# Patient Record
Sex: FEMALE | Race: BLACK OR AFRICAN AMERICAN | NOT HISPANIC OR LATINO
[De-identification: names, ages, dates, MRNs, and addresses within clinical notes are randomized per-mention and may not be internally consistent; named-entity substitution may affect disease eponyms.]

---

## 2017-01-04 ENCOUNTER — APPOINTMENT (OUTPATIENT)
Dept: PULMONOLOGY | Facility: CLINIC | Age: 47
End: 2017-01-04

## 2017-01-04 VITALS
OXYGEN SATURATION: 97 % | SYSTOLIC BLOOD PRESSURE: 130 MMHG | TEMPERATURE: 98.2 F | RESPIRATION RATE: 17 BRPM | HEART RATE: 115 BPM | BODY MASS INDEX: 37.89 KG/M2 | DIASTOLIC BLOOD PRESSURE: 90 MMHG | HEIGHT: 68 IN | WEIGHT: 250 LBS

## 2017-01-21 ENCOUNTER — APPOINTMENT (OUTPATIENT)
Dept: MAMMOGRAPHY | Facility: CLINIC | Age: 47
End: 2017-01-21

## 2017-01-21 ENCOUNTER — APPOINTMENT (OUTPATIENT)
Dept: ULTRASOUND IMAGING | Facility: CLINIC | Age: 47
End: 2017-01-21

## 2017-01-21 ENCOUNTER — OUTPATIENT (OUTPATIENT)
Dept: OUTPATIENT SERVICES | Facility: HOSPITAL | Age: 47
LOS: 1 days | End: 2017-01-21
Payer: COMMERCIAL

## 2017-01-21 DIAGNOSIS — Z98.51 TUBAL LIGATION STATUS: Chronic | ICD-10-CM

## 2017-01-21 DIAGNOSIS — N63 UNSPECIFIED LUMP IN BREAST: ICD-10-CM

## 2017-01-21 PROCEDURE — 77063 BREAST TOMOSYNTHESIS BI: CPT

## 2017-01-21 PROCEDURE — 77067 SCR MAMMO BI INCL CAD: CPT

## 2017-01-21 PROCEDURE — 76641 ULTRASOUND BREAST COMPLETE: CPT

## 2017-02-06 ENCOUNTER — TRANSCRIPTION ENCOUNTER (OUTPATIENT)
Age: 47
End: 2017-02-06

## 2017-02-06 ENCOUNTER — RX RENEWAL (OUTPATIENT)
Age: 47
End: 2017-02-06

## 2017-02-10 ENCOUNTER — INPATIENT (INPATIENT)
Facility: HOSPITAL | Age: 47
LOS: 0 days | Discharge: ANOTHER HOSPITAL | End: 2017-02-11
Attending: INTERNAL MEDICINE | Admitting: INTERNAL MEDICINE
Payer: COMMERCIAL

## 2017-02-10 VITALS
TEMPERATURE: 97 F | OXYGEN SATURATION: 99 % | SYSTOLIC BLOOD PRESSURE: 161 MMHG | RESPIRATION RATE: 18 BRPM | WEIGHT: 244.05 LBS | DIASTOLIC BLOOD PRESSURE: 86 MMHG | HEIGHT: 69 IN | HEART RATE: 122 BPM

## 2017-02-10 DIAGNOSIS — I48.91 UNSPECIFIED ATRIAL FIBRILLATION: ICD-10-CM

## 2017-02-10 DIAGNOSIS — K21.9 GASTRO-ESOPHAGEAL REFLUX DISEASE WITHOUT ESOPHAGITIS: ICD-10-CM

## 2017-02-10 DIAGNOSIS — I10 ESSENTIAL (PRIMARY) HYPERTENSION: ICD-10-CM

## 2017-02-10 DIAGNOSIS — M06.9 RHEUMATOID ARTHRITIS, UNSPECIFIED: ICD-10-CM

## 2017-02-10 DIAGNOSIS — Z98.51 TUBAL LIGATION STATUS: Chronic | ICD-10-CM

## 2017-02-10 DIAGNOSIS — Z29.9 ENCOUNTER FOR PROPHYLACTIC MEASURES, UNSPECIFIED: ICD-10-CM

## 2017-02-10 LAB
ALBUMIN SERPL ELPH-MCNC: 3.5 G/DL — SIGNIFICANT CHANGE UP (ref 3.3–5)
ALP SERPL-CCNC: 72 U/L — SIGNIFICANT CHANGE UP (ref 40–120)
ALT FLD-CCNC: 29 U/L — SIGNIFICANT CHANGE UP (ref 12–78)
ANION GAP SERPL CALC-SCNC: 10 MMOL/L — SIGNIFICANT CHANGE UP (ref 5–17)
APTT BLD: 34.3 SEC — SIGNIFICANT CHANGE UP (ref 27.5–37.4)
APTT BLD: 55.5 SEC — HIGH (ref 27.5–37.4)
AST SERPL-CCNC: 21 U/L — SIGNIFICANT CHANGE UP (ref 15–37)
BASOPHILS # BLD AUTO: 0.2 K/UL — SIGNIFICANT CHANGE UP (ref 0–0.2)
BASOPHILS NFR BLD AUTO: 2.3 % — HIGH (ref 0–2)
BILIRUB SERPL-MCNC: 0.3 MG/DL — SIGNIFICANT CHANGE UP (ref 0.2–1.2)
BUN SERPL-MCNC: 21 MG/DL — SIGNIFICANT CHANGE UP (ref 7–23)
CALCIUM SERPL-MCNC: 8.8 MG/DL — SIGNIFICANT CHANGE UP (ref 8.5–10.1)
CHLORIDE SERPL-SCNC: 108 MMOL/L — SIGNIFICANT CHANGE UP (ref 96–108)
CHOLEST SERPL-MCNC: 156 MG/DL — SIGNIFICANT CHANGE UP (ref 10–199)
CK MB BLD-MCNC: 1.1 % — SIGNIFICANT CHANGE UP (ref 0–3.5)
CK MB BLD-MCNC: <0.6 % — SIGNIFICANT CHANGE UP (ref 0–3.5)
CK MB BLD-MCNC: <0.7 % — SIGNIFICANT CHANGE UP (ref 0–3.5)
CK MB CFR SERPL CALC: 0.7 NG/ML — SIGNIFICANT CHANGE UP (ref 0.5–3.6)
CK MB CFR SERPL CALC: <0.5 NG/ML — SIGNIFICANT CHANGE UP (ref 0.5–3.6)
CK MB CFR SERPL CALC: <0.5 NG/ML — SIGNIFICANT CHANGE UP (ref 0.5–3.6)
CK SERPL-CCNC: 63 U/L — SIGNIFICANT CHANGE UP (ref 26–192)
CK SERPL-CCNC: 68 U/L — SIGNIFICANT CHANGE UP (ref 26–192)
CK SERPL-CCNC: 84 U/L — SIGNIFICANT CHANGE UP (ref 26–192)
CO2 SERPL-SCNC: 27 MMOL/L — SIGNIFICANT CHANGE UP (ref 22–31)
CREAT SERPL-MCNC: 0.85 MG/DL — SIGNIFICANT CHANGE UP (ref 0.5–1.3)
D DIMER BLD IA.RAPID-MCNC: <150 NG/ML DDU — SIGNIFICANT CHANGE UP
EOSINOPHIL # BLD AUTO: 0.2 K/UL — SIGNIFICANT CHANGE UP (ref 0–0.5)
EOSINOPHIL NFR BLD AUTO: 2.4 % — SIGNIFICANT CHANGE UP (ref 0–6)
GLUCOSE SERPL-MCNC: 109 MG/DL — HIGH (ref 70–99)
HCG SERPL-ACNC: 2 MIU/ML — SIGNIFICANT CHANGE UP
HCT VFR BLD CALC: 43.6 % — SIGNIFICANT CHANGE UP (ref 34.5–45)
HCT VFR BLD CALC: 45.5 % — HIGH (ref 34.5–45)
HDLC SERPL-MCNC: 57 MG/DL — SIGNIFICANT CHANGE UP (ref 40–125)
HGB BLD-MCNC: 14.7 G/DL — SIGNIFICANT CHANGE UP (ref 11.5–15.5)
HGB BLD-MCNC: 15.1 G/DL — SIGNIFICANT CHANGE UP (ref 11.5–15.5)
INR BLD: 0.94 RATIO — SIGNIFICANT CHANGE UP (ref 0.88–1.16)
LIPID PNL WITH DIRECT LDL SERPL: 95 MG/DL — SIGNIFICANT CHANGE UP
LYMPHOCYTES # BLD AUTO: 3.2 K/UL — SIGNIFICANT CHANGE UP (ref 1–3.3)
LYMPHOCYTES # BLD AUTO: 47.3 % — HIGH (ref 13–44)
MCHC RBC-ENTMCNC: 29.6 PG — SIGNIFICANT CHANGE UP (ref 27–34)
MCHC RBC-ENTMCNC: 30.9 PG — SIGNIFICANT CHANGE UP (ref 27–34)
MCHC RBC-ENTMCNC: 33.2 GM/DL — SIGNIFICANT CHANGE UP (ref 32–36)
MCHC RBC-ENTMCNC: 33.7 GM/DL — SIGNIFICANT CHANGE UP (ref 32–36)
MCV RBC AUTO: 88.9 FL — SIGNIFICANT CHANGE UP (ref 80–100)
MCV RBC AUTO: 91.8 FL — SIGNIFICANT CHANGE UP (ref 80–100)
MONOCYTES # BLD AUTO: 0.6 K/UL — SIGNIFICANT CHANGE UP (ref 0–0.9)
MONOCYTES NFR BLD AUTO: 8.3 % — SIGNIFICANT CHANGE UP (ref 2–14)
NEUTROPHILS # BLD AUTO: 2.6 K/UL — SIGNIFICANT CHANGE UP (ref 1.8–7.4)
NEUTROPHILS NFR BLD AUTO: 39.6 % — LOW (ref 43–77)
PLATELET # BLD AUTO: 115 K/UL — LOW (ref 150–400)
PLATELET # BLD AUTO: 117 K/UL — LOW (ref 150–400)
POTASSIUM SERPL-MCNC: 4.2 MMOL/L — SIGNIFICANT CHANGE UP (ref 3.5–5.3)
POTASSIUM SERPL-SCNC: 4.2 MMOL/L — SIGNIFICANT CHANGE UP (ref 3.5–5.3)
PROT SERPL-MCNC: 7.6 GM/DL — SIGNIFICANT CHANGE UP (ref 6–8.3)
PROTHROM AB SERPL-ACNC: 10.5 SEC — SIGNIFICANT CHANGE UP (ref 10–13.1)
RBC # BLD: 4.75 M/UL — SIGNIFICANT CHANGE UP (ref 3.8–5.2)
RBC # BLD: 5.12 M/UL — SIGNIFICANT CHANGE UP (ref 3.8–5.2)
RBC # FLD: 13.6 % — SIGNIFICANT CHANGE UP (ref 11–15)
RBC # FLD: 14.2 % — SIGNIFICANT CHANGE UP (ref 11–15)
SODIUM SERPL-SCNC: 145 MMOL/L — SIGNIFICANT CHANGE UP (ref 135–145)
TOTAL CHOLESTEROL/HDL RATIO MEASUREMENT: 2.7 RATIO — LOW (ref 3.3–7.1)
TRIGL SERPL-MCNC: 22 MG/DL — SIGNIFICANT CHANGE UP (ref 10–149)
TROPONIN I SERPL-MCNC: 0.04 NG/ML — SIGNIFICANT CHANGE UP (ref 0.01–0.04)
TROPONIN I SERPL-MCNC: 0.05 NG/ML — HIGH (ref 0.01–0.04)
TROPONIN I SERPL-MCNC: <.015 NG/ML — SIGNIFICANT CHANGE UP (ref 0.01–0.04)
TSH SERPL-MCNC: 2.04 UU/ML — SIGNIFICANT CHANGE UP (ref 0.36–3.74)
WBC # BLD: 6.1 K/UL — SIGNIFICANT CHANGE UP (ref 3.8–10.5)
WBC # BLD: 6.7 K/UL — SIGNIFICANT CHANGE UP (ref 3.8–10.5)
WBC # FLD AUTO: 6.1 K/UL — SIGNIFICANT CHANGE UP (ref 3.8–10.5)
WBC # FLD AUTO: 6.7 K/UL — SIGNIFICANT CHANGE UP (ref 3.8–10.5)

## 2017-02-10 PROCEDURE — 99255 IP/OBS CONSLTJ NEW/EST HI 80: CPT

## 2017-02-10 PROCEDURE — 99233 SBSQ HOSP IP/OBS HIGH 50: CPT | Mod: 25

## 2017-02-10 PROCEDURE — 99291 CRITICAL CARE FIRST HOUR: CPT

## 2017-02-10 PROCEDURE — 99223 1ST HOSP IP/OBS HIGH 75: CPT

## 2017-02-10 PROCEDURE — 71275 CT ANGIOGRAPHY CHEST: CPT | Mod: 26

## 2017-02-10 PROCEDURE — 71010: CPT | Mod: 26

## 2017-02-10 RX ORDER — HEPARIN SODIUM 5000 [USP'U]/ML
5000 INJECTION INTRAVENOUS; SUBCUTANEOUS ONCE
Qty: 0 | Refills: 0 | Status: COMPLETED | OUTPATIENT
Start: 2017-02-10 | End: 2017-02-10

## 2017-02-10 RX ORDER — HEPARIN SODIUM 5000 [USP'U]/ML
INJECTION INTRAVENOUS; SUBCUTANEOUS
Qty: 25000 | Refills: 0 | Status: DISCONTINUED | OUTPATIENT
Start: 2017-02-10 | End: 2017-02-10

## 2017-02-10 RX ORDER — SODIUM CHLORIDE 9 MG/ML
1000 INJECTION INTRAMUSCULAR; INTRAVENOUS; SUBCUTANEOUS ONCE
Qty: 0 | Refills: 0 | Status: COMPLETED | OUTPATIENT
Start: 2017-02-10 | End: 2017-02-10

## 2017-02-10 RX ORDER — HEPARIN SODIUM 5000 [USP'U]/ML
6000 INJECTION INTRAVENOUS; SUBCUTANEOUS EVERY 6 HOURS
Qty: 0 | Refills: 0 | Status: DISCONTINUED | OUTPATIENT
Start: 2017-02-10 | End: 2017-02-10

## 2017-02-10 RX ORDER — PANTOPRAZOLE SODIUM 20 MG/1
40 TABLET, DELAYED RELEASE ORAL
Qty: 0 | Refills: 0 | Status: DISCONTINUED | OUTPATIENT
Start: 2017-02-10 | End: 2017-02-11

## 2017-02-10 RX ORDER — APIXABAN 2.5 MG/1
5 TABLET, FILM COATED ORAL
Qty: 0 | Refills: 0 | Status: DISCONTINUED | OUTPATIENT
Start: 2017-02-10 | End: 2017-02-11

## 2017-02-10 RX ORDER — APIXABAN 2.5 MG/1
5 TABLET, FILM COATED ORAL
Qty: 0 | Refills: 0 | Status: DISCONTINUED | OUTPATIENT
Start: 2017-02-10 | End: 2017-02-10

## 2017-02-10 RX ADMIN — HEPARIN SODIUM 1000 UNIT(S)/HR: 5000 INJECTION INTRAVENOUS; SUBCUTANEOUS at 04:33

## 2017-02-10 RX ADMIN — SODIUM CHLORIDE 1000 MILLILITER(S): 9 INJECTION INTRAMUSCULAR; INTRAVENOUS; SUBCUTANEOUS at 01:15

## 2017-02-10 RX ADMIN — HEPARIN SODIUM 5000 UNIT(S): 5000 INJECTION INTRAVENOUS; SUBCUTANEOUS at 04:33

## 2017-02-10 RX ADMIN — APIXABAN 5 MILLIGRAM(S): 2.5 TABLET, FILM COATED ORAL at 14:47

## 2017-02-10 RX ADMIN — PANTOPRAZOLE SODIUM 40 MILLIGRAM(S): 20 TABLET, DELAYED RELEASE ORAL at 09:06

## 2017-02-10 NOTE — H&P ADULT. - RS GEN PE MLT RESP DETAILS PC
respirations non-labored/no rales/no wheezes/breath sounds equal/airway patent/no rhonchi/good air movement/clear to auscultation bilaterally

## 2017-02-10 NOTE — PROGRESS NOTE ADULT - ASSESSMENT
Per Dr. Mon's note: "46F PMH uterine leiomyoma, GERD, obesity, RA on methotrexate, s/p right total knee replacement 6/20/16 complicated by bilateral PE s/p 6 month course of anticoagulation with xarelto off AC x1 month here with palpitations. Found to be in a-fib RVR.     1. CV  - a-fib RVR: rate currently controlled on diltiazem PO   - oral anticoagulation with eloquis per cardiology  - telemetry monitoring  - follow up 2D echo  - pt requesting transfer to [facility] where her physicians are. Pt herself called Dr Sweeney to see if he'd be accepting physician at [facility]    2. PULM  - symptoms likely related to underlying a-fib  - CTA chest: suboptimal bolus of contrast, no cental PE noted. In setting of low D dimer and negative CTA chest PE less likely. If pt had PE, treatment would be anticoagulation and pt already started on AC for underlying a-fib.   - outpatient pulmonary follow up with Dr Lisker (pulmonary) [address] [place] [address]. Discussed with Dr Lisker and informed her of pt's admission and results. She will follow as outpatient."

## 2017-02-10 NOTE — H&P ADULT. - PROBLEM SELECTOR PLAN 1
admit to tele  dee  cardizem ivp and start po 30 q6 and titrate  start heparin,  d/w pt re long term ac, pt will check w/insurance if pradaxa covered otherwise xarelto which she used for PE  cardio consult

## 2017-02-10 NOTE — CONSULT NOTE ADULT - SUBJECTIVE AND OBJECTIVE BOX
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 Post operative course complicated by tachycardia and oxygen desaturation. Work up with CTA chest done + bilateral PE. LE dopplers negative for DVT. ECHO at the time with moderate RA enlargement and RV enlargement. Pt started on xarelto for anticoagulation. Follows with Dr Lisker (pulmonary) and Dr Sweeney (cardiology), Dr Glaser (rheum). Was treated with a total of 6 months of anticoagulation d/angelita 1/4/2017 as PE was believed to be provoked in setting of orthopedic surgery.  PT presents to ER 2/10/17 with complaints of sudden onset of palpitations. Reports being in usual state of health. No recent travel or trauma. Found to be in a-fib RVR HR 120s. Given cardizem 10mg IVP x1 and cardizem 20mg IVP x1 with improvement in HR. Started on heparin drip for anticoagulation for a-fib. Seen by cardiology this morning, heparin drip d/angelita and started on oral anticoagulation: apixaban 5mg oral; twice daily.     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 Post operative course complicated by tachycardia and oxygen desaturation. Work up with CTA chest done + bilateral PE. LE dopplers negative for DVT. ECHO at the time with moderate RA enlargement and RV enlargement. Pt started on xarelto for anticoagulation. Follows with Dr Lisker (pulmonary) and Dr Sweeney (cardiology), Dr Glaser (rheum). Was treated with a total of 6 months of anticoagulation d/angelita 1/4/2017 as PE was believed to be provoked in setting of orthopedic surgery.  PT presents to ER 2/10/17 with complaints of sudden onset of palpitations. Reports being in usual state of health. No recent travel or trauma. Found to be in a-fib RVR HR 120s. Given cardizem 10mg IVP x1 and cardizem 20mg IVP x1 with improvement in HR. Started on heparin drip for anticoagulation for a-fib. Seen by cardiology this morning, heparin drip d/angelita and started on oral anticoagulation: apixaban 5mg oral; twice daily.     ROS:  CONSTITUTIONAL: no fevers, no chills  NEURO: no HA, no dizziness, no tremors  HEENT: no visual disturbances, no sore throat  CARDIO: palpitations, substernal chest pain, no orthopnea  PULMONARY: + SOB, dyspnea with exertion - climbing stares past few weeks, resolved with sitting down, no cough, no hemoptysis  ABDOMEN: no nausea, no emesis, no abdominal pain  EXTREMITY: no edema, no pain  VASCULAR: varicose veins    PMH:  RA  PE 6/2016  GERD  Obesity  uterine leiomyoma    PSH:  R total knee replacement 6/20/16  tubal ligation 2007    Family HX:  DM, CAD, CVA    SOCIAL HX:    works as RN  occasional alcohol  non smoker     VITALS  /85  HR 82 a-fib  RR 16  Sat 98%  T 97.8    Physical Exam:  GEN: well groomed, well appearing, NAD  HEENT: NC/AT, EOMI, sclera white  CV: irregular rhythm  PULM: CTA bilaterally, no wheeze, no rhonchi  ABDOMEN: soft, NT, ND, (+) BS, no guarding, no rebound  EXT: no edema/cyanosis; left lower extremity varicose veins  VASC: pulses 2+ symmetrical  NEURO: awake, alert, oriented x3    Complete Blood Count (02.10.17 @ 10:56)    WBC Count: 6.1 K/uL    RBC Count: 4.75 M/uL    Hemoglobin: 14.7 g/dL    Hematocrit: 43.6 %    Mean Cell Volume: 91.8 fl    Mean Cell Hemoglobin: 30.9 pg    Mean Cell Hemoglobin Conc: 33.7 gm/dL    Red Cell Distrib Width: 14.2 %    Platelet Count - Automated: 115 K/uL    Comprehensive Metabolic Panel (02.10.17 @ 02:08)    Sodium, Serum: 145 mmol/L    Potassium, Serum: 4.2 mmol/L    Chloride, Serum: 108 mmol/L    Carbon Dioxide, Serum: 27 mmol/L    Anion Gap, Serum: 10 mmol/L    Blood Urea Nitrogen, Serum: 21 mg/dL    Creatinine, Serum: 0.85 mg/dL    Glucose, Serum: 109 mg/dL    Calcium, Total Serum: 8.8 mg/dL    Protein Total, Serum: 7.6 gm/dL    Albumin, Serum: 3.5 g/dL    Bilirubin Total, Serum: 0.3 mg/dL    Alkaline Phosphatase, Serum: 72 U/L    Aspartate Aminotransferase (AST/SGOT): 21 U/L    Alanine Aminotransferase (ALT/SGPT): 29 U/L    D-Dimer Assay, Quantitative (02.10.17 @ 02:08): <150 ng/mL     Creatine Kinase, Serum (02.10.17 @ 08:48): 68 U/L  Creatine Kinase, Serum (02.10.17 @ 02:08): 84 U/L    Troponin I, Serum (02.10.17 @ 08:48): .050 ng/mL  Troponin I, Serum (02.10.17 @ 02:08): <.015 ng/mL    IMAGING:  CXR: There are no focal air space opacities.     EKG: a-fib HR 76 BPM

## 2017-02-10 NOTE — ED ADULT NURSE NOTE - CAS EDN DISCHARGE ASSESSMENT
No adverse reaction to first time med in ED/Symptoms improved/Awake/Alert and oriented to person, place and time

## 2017-02-10 NOTE — CONSULT NOTE ADULT - PROBLEM SELECTOR RECOMMENDATION 9
Rates better but still significant inc VR's on minimal exertion. Titrate AVN blockers as needed. Lengthy d/w patient, offered consideration of DCCV +/- RFA, patient prefers medical management at present. Start Eliquis, can d/c heparin. Ambulate and observe VR's.

## 2017-02-10 NOTE — ED PROVIDER NOTE - OBJECTIVE STATEMENT
Pertinent PMH/PSH/FHx/SHx and Review of Systems contained within:  Patient with history of right knee replacement, pulmonary emboli (stopped taking xarelto last month), RA presents to the ED for palpitations and chest discomfort.  Woke up feeling chest pounding.  On arrival patient is in rapid atrial fib.  Denies any shortness of breath, dizziness.  BP stable.   No prior history of atrial fib.  Patient denies EtOH/tobacco/illicit substance use.  Cardiologist Dr. Haile at [facility] and pulmonologist Dr. Spear.       No fever/chills, No photophobia/eye pain/changes in vision, No ear pain/sore throat/dysphagia, no SOB/cough/wheeze/stridor, No abdominal pain, No N/V/D, no dysuria/frequency/discharge, No neck/back pain, no rash, no changes in neurological status/function.

## 2017-02-10 NOTE — ED ADULT NURSE REASSESSMENT NOTE - NS ED NURSE REASSESS COMMENT FT1
had dr abdirahman salinas, was able to talk to house dr fish and informed her about elevated trop level for patient.
heparin drip discontinued as per dr estes d/c the order, v/s taken and is stable.
patient received in bed awake, alert and oriented x4, denies any pain, chest pain, no dizziness, no noted palpitations on the monitor. v/s stable.

## 2017-02-10 NOTE — H&P ADULT. - FAMILY HISTORY
Sibling  Still living? No  Family history of diabetes mellitus (DM), Age at diagnosis: Age Unknown  Family history of coronary artery bypass graft surgery, Age at diagnosis: Age Unknown  Family history of stroke, Age at diagnosis: Age Unknown

## 2017-02-10 NOTE — PROGRESS NOTE ADULT - SUBJECTIVE AND OBJECTIVE BOX
Patient is a 46y old  Female who presents with a chief complaint of palpitations (10 Feb 2017 06:42)    HPI:  Pt. 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   Pt denies any CP, n/v/d/c no such sx's in past. no recent travel no trauma or sick contact.    TTE 7/16 (post PE)  EF 63%  CONCLUSIONS:  1. Normal mitral valve. Minimal mitral regurgitation.  2. Normal left ventricular internal dimensions and wall  thicknesses.  3. Normal left ventricular systolic function. No segmental  wall motion abnormalities.  4. Normal right ventricular size and systolic function.  5. Inadequate tricuspid regurgitation Doppler envelope  precludes estimation of RVSP.  --------------------------------------------------- (10 Feb 2017 06:42)       INTERVAL HPI/OVERNIGHT EVENTS:        Allergies    sulfa drugs (Unknown)    Intolerances          Vital Signs Last 24 Hrs  T(C): 37.1, Max: 37.1 (02-10 @ 16:41)  T(F): 98.7, Max: 98.7 (02-10 @ 16:41)  HR: 81 (72 - 122)  BP: 104/76 (104/76 - 140/90)  BP(mean): 106 (106 - 106)  RR: 17 (15 - 20)  SpO2: 98% (97% - 100%)  CAPILLARY BLOOD GLUCOSE            PHYSICAL EXAMINATION:  PHYSICAL EXAM:  GENERAL: NAD, well-groomed, well-developed  HEAD:  Atraumatic, Normocephalic  EYES: EOMI,conjunctiva and sclera clear  ENMT: moist  NECK Supple, No JVD, Normal thyroid  NERVOUS SYSTEM:  Alert & Oriented X3, Good concentration;   CHEST/LUNG: Clear to percussion bilaterally; No rales, rhonchi, wheezing, or rubs  MUSCULOSKELETAL:  tenderness on palpation anterior chest bilat  HEART: Regular rate and rhythm; No murmurs, rubs, or gallops  ABDOMEN: Soft, Nontender, Nondistended; Bowel sounds present  EXTREMITIES:  2+ Peripheral Pulses, No clubbing, cyanosis, or edema  SKIN: right knee scar.          LABS:                        14.7   6.1   )-----------( 115      ( 10 Feb 2017 10:56 )             43.6     10 Feb 2017 02:08    145    |  108    |  21     ----------------------------<  109    4.2     |  27     |  0.85     Ca    8.8        10 Feb 2017 02:08    TPro  7.6    /  Alb  3.5    /  TBili  0.3    /  DBili  x      /  AST  21     /  ALT  29     /  AlkPhos  72     10 Feb 2017 02:08    PT/INR - ( 10 Feb 2017 02:08 )   PT: 10.5 sec;   INR: 0.94 ratio         PTT - ( 10 Feb 2017 10:56 )  PTT:55.5 sec    CARDIAC MARKERS ( 10 Feb 2017 15:40 )  .038 ng/mL / x     / 63 U/L / x     / 0.7 ng/mL  CARDIAC MARKERS ( 10 Feb 2017 08:48 )  .050 ng/mL / x     / 68 U/L / x     / <0.5 ng/mL  CARDIAC MARKERS ( 10 Feb 2017 02:08 )  <.015 ng/mL / x     / 84 U/L / x     / <0.5 ng/mL                RADIOLOGY & ADDITIONAL TESTS:        MEDICATIONS  (STANDING):  pantoprazole    Tablet 40milliGRAM(s) Oral before breakfast  diltiazem    Tablet 60milliGRAM(s) Oral three times a day  apixaban 5milliGRAM(s) Oral two times a day    MEDICATIONS  (PRN):        ASSESSMENT AND PLAN:  FRANCISCO [id number]  ATRIAL FIBRILATION WITH RAPID VENTRICULAR RESPONSE: ATRIAL FIBRILATION WITH RAPID VENTRICULAR RESPONSE  PALPITATIONS: PALPITATIONS  Uterine leiomyoma  GERD (gastroesophageal reflux disease)  Obesity  RA (rheumatoid arthritis)  No pertinent past medical history  Atrial fibrillation with rapid ventricular response  Preventive measure: Preventive measure  RA (rheumatoid arthritis): RA (rheumatoid arthritis)  Gastroesophageal reflux disease without esophagitis: Gastroesophageal reflux disease without esophagitis  Atrial fibrillation with rapid ventricular response: Atrial fibrillation with rapid ventricular response        Complete Blood Count + Automated Diff  Comprehensive Metabolic Panel  Prothrombin Time and INR, Plasma  Activated Partial Thromboplastin Time  sodium chloride 0.9% Bolus [completed]  Troponin I, Serum  CKMB Mass Assay  Creatine Kinase, Serum  12 Lead ECG  D-Dimer Assay, Quantitative  diltiazem Injectable [completed]  Xray Chest 1 View AP/PA.  HCG Quantitative, Serum  diltiazem Injectable [completed]  heparin  Infusion. [discontinued]  heparin  Injectable [completed]  heparin  Injectable [discontinued]  diltiazem Injectable [completed]  diltiazem    Tablet [discontinued]  Diet, Regular  (ADM OVERRIDE) [completed]  (ADM OVERRIDE) [completed]  (ADM OVERRIDE) [completed]  Creatine Kinase, Serum  CKMB Mass Assay  Troponin I, Serum  Thyroid Stimulating Hormone, Serum  Lipid Profile  apixaban [discontinued]  TTE Echo Doppler w/o Cont  pantoprazole    Tablet  diltiazem    Tablet  Thyroid Stimulating Hormone, Serum  (ADM OVERRIDE) [completed]  Activated Partial Thromboplastin Time  Complete Blood Count  Complete Blood Count  Troponin I, Serum  CKMB Mass Assay  Creatine Kinase, Serum  12 Lead ECG  CT Angio Chest w/ IV Cont  apixaban  (ADM OVERRIDE) [completed]

## 2017-02-10 NOTE — CONSULT NOTE ADULT - SUBJECTIVE AND OBJECTIVE BOX
CARDIOLOGY CONSULT NOTE    Patient is a 46y Female with a known history of :  Preventive measure (Z29.9)  RA (rheumatoid arthritis) (M06.9)  Gastroesophageal reflux disease without esophagitis (K21.9)  Atrial fibrillation with rapid ventricular response (I48.91)    HPI:  46 yr old female with known RA (on methotrexate), GERD and s/p L TKR in 7/16 complicated by PE. Completed 6 months of DOAC therapy, stopped 1 month ago. She was in usoh until day of asmission when woke up with rapid irregular heart beats. When questioned , patient has noticed intermittent palpitations especially after moderate exertion [place]), so it is unclear how long she may have had an arrhythmia.She is a RN at [employer].   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 No recent travel no trauma or sick contact.    REVIEW OF SYSTEMS:    CONSTITUTIONAL: No fever, weight loss, or fatigue  EYES: No eye pain, visual disturbances, or discharge  ENMT:  No difficulty hearing, tinnitus, vertigo; No sinus or throat pain  NECK: No pain or stiffness  BREASTS: No pain, masses, or nipple discharge  RESPIRATORY: No cough, wheezing, chills or hemoptysis; No shortness of breath  CARDIOVASCULAR: No chest pain, palpitations, dizziness, or leg swelling  GASTROINTESTINAL: No abdominal or epigastric pain. No nausea, vomiting, or hematemesis; No diarrhea or constipation. No melena or hematochezia.  GENITOURINARY: No dysuria, frequency, hematuria, or incontinence  NEUROLOGICAL: No headaches, memory loss, loss of strength, numbness, or tremors  SKIN: No itching, burning, rashes, or lesions   LYMPH NODES: No enlarged glands  ENDOCRINE: No heat or cold intolerance; No hair loss  MUSCULOSKELETAL: No joint pain or swelling; No muscle, back, or extremity pain  PSYCHIATRIC: No depression, anxiety, mood swings, or difficulty sleeping  HEME/LYMPH: No easy bruising, or bleeding gums  ALLERGY AND IMMUNOLOGIC: No hives or eczema    Home Medications:   * Incomplete Medication History as of 10-Feb-2017 00:53 documented in Prescription Writer  · 	folic acid 1 mg oral tablet: Last Dose Taken:  , 1 tab(s) orally once a day  · 	Protonix 40 mg oral delayed release tablet: Last Dose Taken:  , 1 tab(s) orally once a day, As Needed  · 	methotrexate 2.5 mg oral tablet: Last Dose Taken:  , 6 tab(s) orally every 7 days - 0n Mondays  ALLERGIES: sulfa drugs (Unknown)      FAMILY HISTORY:  Family history of stroke (Sibling)  Family history of coronary artery bypass graft surgery (Sibling)  Family history of diabetes mellitus (DM) (Sibling)  No pertinent family history in first degree relatives  No significant family history      PHYSICAL EXAMINATION:  -----------------------------  T(C): 37, Max: 37 (02-10 @ 08:03)  HR: 88 (72 - 122)  BP: 109/64 (109/64 - 140/90)  RR: 18 (16 - 18)  SpO2: 97% (97% - 100%)  Wt(kg): --    Height (cm): 175.3 (02-10 @ 00:42)  Weight (kg): 110.7 (02-10 @ 00:42)  BMI (kg/m2): 36 (02-10 @ 00:42)  BSA (m2): 2.25 (02-10 @ 00:42)    Constitutional: well developed, normal appearance, well groomed, well nourished, no deformities and no acute distress.   Eyes: the conjunctiva exhibited no abnormalities and the eyelids demonstrated no xanthelasmas.   HEENT: normal oral mucosa, no oral pallor and no oral cyanosis.   Neck: normal jugular venous A waves present, normal jugular venous V waves present and no jugular venous durbin A waves.   Pulmonary: no respiratory distress, normal respiratory rhythm and effort, no accessory muscle use and lungs were clear to auscultation bilaterally.   Cardiovascular: heart rate and rhythm were normal, normal S1 and S2 and no murmur, gallop, rub, heave or thrill are present.   Abdomen: soft, non-tender, no hepato-splenomegaly and no abdominal mass palpated.   Musculoskeletal: the gait could not be assessed..   Extremities: no clubbing of the fingernails, no localized cyanosis, no petechial hemorrhages and no ischemic changes.   Skin: normal skin color and pigmentation, no rash, no venous stasis, no skin lesions, no skin ulcer and no xanthoma was observed.   Psychiatric: oriented to person, place, and time, the affect was normal, the mood was normal and not feeling anxious.     LABS:   --------  10 Feb 2017 02:08    145    |  108    |  21     ----------------------------<  109    4.2     |  27     |  0.85     Ca    8.8        10 Feb 2017 02:08    TPro  7.6    /  Alb  3.5    /  TBili  0.3    /  DBili  x      /  AST  21     /  ALT  29     /  AlkPhos  72     10 Feb 2017 02:08                         15.1   6.7   )-----------( 117      ( 10 Feb 2017 02:08 )             45.5     PT/INR - ( 10 Feb 2017 02:08 )   PT: 10.5 sec;   INR: 0.94 ratio         PTT - ( 10 Feb 2017 02:08 )  PTT:34.3 sec    02-10 @ 02:08 CPK total:--, CKMB --, Troponin I - <.015 ng/mL          RADIOLOGY:  -----------------    EXAM:  CHEST SINGLE VIEW                            PROCEDURE DATE:  02/10/2017        INTERPRETATION:  PROCEDURE: AP view of the chest.    CLINICAL INFORMATION: Palpitations    COMPARISON: 6/23/2016    FINDINGS:        There are no focal air spaceopacities. The cardiac and mediastinal   contours are prominent, which may be due to magnification from AP   technique and shallow inspiration. The osseous structures are intact.    IMPRESSION:    No focal air space opacities.        ECG: Afib, NSSTW changes, no old ecg for comp

## 2017-02-10 NOTE — H&P ADULT. - HISTORY OF PRESENT ILLNESS
Pt. 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   Pt denies any CP, n/v/d/c no such sx's in past. no recent travel no trauma or sick contact.    TTE 7/16 (post PE)  EF 63%  CONCLUSIONS:  1. Normal mitral valve. Minimal mitral regurgitation.  2. Normal left ventricular internal dimensions and wall  thicknesses.  3. Normal left ventricular systolic function. No segmental  wall motion abnormalities.  4. Normal right ventricular size and systolic function.  5. Inadequate tricuspid regurgitation Doppler envelope  precludes estimation of RVSP.  ---------------------------------------------------

## 2017-02-10 NOTE — ED PROVIDER NOTE - MEDICAL DECISION MAKING DETAILS
Patient with new onset rapid atrial fib.  Rate controlled with cardizem, heparin drip started.  Dimer neg.  CXR wnl.  Will need cardiology consult in the morning.  Patient is to be admitted to the hospital and the case was discussed with the admitting physician.  Any changes in plan, additional imaging/labs, and further work up will be at the discretion of the admitting physician.  Patient remained stable in my care.

## 2017-02-10 NOTE — CONSULT NOTE ADULT - ASSESSMENT
45 yo AA female with no known CAD. Afib with RVR's, uncertain duration. Morbid obesity, s/p TKR. Rates improving with CACB's. On Iv heparin.  PMH:  RA (rheumatoid arthritis) (M06.9)  Gastroesophageal reflux disease without esophagitis (K21.9)
46F PMH uterine leiomyoma, GERD, obesity, RA on methotrexate, s/p right total knee replacement 6/20/16 complicated by bilateral PE s/p 6 month course of anticoagulation with xarelto off AC x1 month here with palpitations. Found to be in a-fib RVR.     1. CV  - a-fib RVR: rate currently controlled on diltiazem PO   - oral anticoagulation with eloquis per cardiology  - telemetry monitoring  - follow up 2D echo  - pt requesting transfer to [facility] where her physicians are. Pt herself called Dr Sweeney to see if he'd be accepting physician at [facility]    2. PULM  - symptoms likely related to underlying a-fib  - CTA chest: suboptimal bolus of contrast, no cental PE noted. In setting of low D dimer and negative CTA chest PE less likely. If pt had PE, treatment would be anticoagulation and pt already started on AC for underlying a-fib.   - outpatient pulmonary follow up with Dr Lisker (pulmonary) [address] [place] [address]. Discussed with Dr Lisker and informed her of pt's admission and results. She will follow as outpatient.

## 2017-02-10 NOTE — H&P ADULT. - NEUROLOGICAL DETAILS
responds to verbal commands/sensation intact/deep reflexes intact/cranial nerves intact/responds to pain/alert and oriented x 3

## 2017-02-10 NOTE — ACUTE INTERFACILITY TRANSFER NOTE - HOSPITAL COURSE
Patient with history of right knee replacement, pulmonary emboli (stopped taking xarelto last month), RA presents to the ED for palpitations and chest discomfort.  Woke up feeling chest pounding.  On arrival patient is in rapid atrial fib.  Denies any shortness of breath, dizziness.  BP stable.   No prior history of atrial fib.  Patient denies EtOH/tobacco/illicit substance use.  Cardiologist Dr. Haile at [facility] and pulmonologist Dr. Spear. Patient started on Eliquis BID and Cardizem 60mg TID.

## 2017-02-11 VITALS
HEART RATE: 96 BPM | RESPIRATION RATE: 18 BRPM | SYSTOLIC BLOOD PRESSURE: 122 MMHG | DIASTOLIC BLOOD PRESSURE: 97 MMHG | OXYGEN SATURATION: 97 %

## 2017-02-11 DIAGNOSIS — G47.33 OBSTRUCTIVE SLEEP APNEA (ADULT) (PEDIATRIC): ICD-10-CM

## 2017-02-11 LAB
HCT VFR BLD CALC: 41.5 % — SIGNIFICANT CHANGE UP (ref 34.5–45)
HGB BLD-MCNC: 14.3 G/DL — SIGNIFICANT CHANGE UP (ref 11.5–15.5)
MCHC RBC-ENTMCNC: 30.6 PG — SIGNIFICANT CHANGE UP (ref 27–34)
MCHC RBC-ENTMCNC: 34.4 GM/DL — SIGNIFICANT CHANGE UP (ref 32–36)
MCV RBC AUTO: 88.8 FL — SIGNIFICANT CHANGE UP (ref 80–100)
PLATELET # BLD AUTO: 123 K/UL — LOW (ref 150–400)
RBC # BLD: 4.67 M/UL — SIGNIFICANT CHANGE UP (ref 3.8–5.2)
RBC # FLD: 13.5 % — SIGNIFICANT CHANGE UP (ref 11–15)
WBC # BLD: 5.7 K/UL — SIGNIFICANT CHANGE UP (ref 3.8–10.5)
WBC # FLD AUTO: 5.7 K/UL — SIGNIFICANT CHANGE UP (ref 3.8–10.5)

## 2017-02-11 PROCEDURE — 93306 TTE W/DOPPLER COMPLETE: CPT | Mod: 26

## 2017-02-11 PROCEDURE — 99232 SBSQ HOSP IP/OBS MODERATE 35: CPT

## 2017-02-11 PROCEDURE — 99239 HOSP IP/OBS DSCHRG MGMT >30: CPT

## 2017-02-11 RX ORDER — RIVAROXABAN 15 MG-20MG
1 KIT ORAL
Qty: 0 | Refills: 0 | COMMUNITY

## 2017-02-11 RX ORDER — DILTIAZEM HCL 120 MG
1 CAPSULE, EXT RELEASE 24 HR ORAL
Qty: 90 | Refills: 0
Start: 2017-02-11 | End: 2017-03-13

## 2017-02-11 RX ORDER — RIVAROXABAN 15 MG-20MG
1 KIT ORAL
Qty: 30 | Refills: 0
Start: 2017-02-11 | End: 2017-03-13

## 2017-02-11 RX ORDER — RIVAROXABAN 15 MG-20MG
1 KIT ORAL
Qty: 30 | Refills: 0 | OUTPATIENT
Start: 2017-02-11 | End: 2017-03-13

## 2017-02-11 RX ADMIN — APIXABAN 5 MILLIGRAM(S): 2.5 TABLET, FILM COATED ORAL at 17:47

## 2017-02-11 RX ADMIN — APIXABAN 5 MILLIGRAM(S): 2.5 TABLET, FILM COATED ORAL at 06:10

## 2017-02-11 RX ADMIN — PANTOPRAZOLE SODIUM 40 MILLIGRAM(S): 20 TABLET, DELAYED RELEASE ORAL at 06:10

## 2017-02-11 NOTE — PROGRESS NOTE ADULT - NSHPATTENDINGPLANDISCUSS_GEN_ALL_CORE
Dr. Justa Mon
Patient and , communicated with Dr. Hernandez. Provided info from [employer] on OSD and cardiac dz incl A fib.

## 2017-02-11 NOTE — PROGRESS NOTE ADULT - PROBLEM SELECTOR PLAN 5
ALSO STATES  HAS TOLD HER RECETNLY THAT HE HAS HEARD HER SNORING, AND SHE HAS AWOKEN WITH SNORTING RESPIRATIONS A FEW TIMES RECENTLY. NEVER HAD SLEEP STUDY. Dr. Hernandez to evaluate.

## 2017-02-11 NOTE — PROGRESS NOTE ADULT - ASSESSMENT
Per Dr. Mon's note of 2/10/17: "46F PMH uterine leiomyoma, GERD, obesity, RA on methotrexate, s/p right total knee replacement 6/20/16 complicated by bilateral PE s/p 6 month course of anticoagulation with xarelto off AC x1 month here with palpitations. Found to be in a-fib RVR.     1. CV  - a-fib RVR: rate currently controlled on diltiazem PO   - oral anticoagulation with eloquis per cardiology  - telemetry monitoring  - follow up 2D echo  - pt requesting transfer to [facility] where her physicians are. Pt herself called Dr Sweeney to see if he'd be accepting physician at [facility]    2. PULM  - symptoms likely related to underlying a-fib  - CTA chest: suboptimal bolus of contrast, no cental PE noted. In setting of low D dimer and negative CTA chest PE less likely. If pt had PE, treatment would be anticoagulation and pt already started on AC for underlying a-fib.   - outpatient pulmonary follow up with Dr Lisker (pulmonary) [address] [place] [address]. Discussed with Dr Lisker and informed her of pt's admission and results. She will follow as outpatient."

## 2017-02-11 NOTE — PROGRESS NOTE ADULT - PROBLEM SELECTOR PLAN 1
cont tele  troponins neg  cardizem  po 30 q6h  start heparin,  d/w pt re long term ac, pt will check w/insurance if pradaxa covered otherwise xarelto which she used for PE  cardio consult

## 2017-02-11 NOTE — DISCHARGE NOTE ADULT - HOSPITAL COURSE
Admitting provider stated: "Pt. 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   Pt denies any CP, n/v/d/c no such sx's in past. no recent travel no trauma or sick contact."  diltiazem IV started, then made transition or oral diltiazem.  Started Eliquis for A fib, Cardiologist Dr. Raulito Pina felt could be dced with Xarelto as has some Xarelto tabs at home and unsure of Insurance coverage for Eliquis.  ECHO performed on day of discharge, cardiologist reviewed, transcribed report not yet available in EMR but essentially unchanged from ECHO of July 2016.      EXAM:  CT ANGIO CHEST (W)AW IC                            PROCEDURE DATE:  02/10/2017        INTERPRETATION:  CLINICAL INFORMATION: History of postoperative PE status   post 6 month course of the relative presenting with A. fib    COMPARISON: CTA of the chest performed on 6/24/2016    PROCEDURE:     Serial axial sections through the chest were obtained following   administration of nonionic intravenous contrast utilizing pulmonary   embolism protocol. Sagittal and coronal reformats were then generated   from the axial images. 3-D reconstructions were performed on an   independent workstation.    80 mls of Omnipaque 350 was administered intravenously without   complication and 20 mls were discarded.    FINDINGS:     PULMONARY ARTERIES: The bolus is of suboptimal quality for diagnosis of   pulmonary embolism. There are no large/central pulmonary arterial filling   defects identified.    LUNG AND LARGE AIRWAYS: Tracheobronchial tree is patent. There is trace   bibasilar atelectasis.  PLEURA: Within normal limits.  VESSELS: Within normal limits.  HEART: Mildly enlarged in size No pericardial effusion.  MEDIASTINUM AND KAELA: Within normal limits.  CHEST WALL AND LOWER NECK: Within normal limits.    UPPER ABDOMEN: Subcentimeter cystic lesion within the right hepatic lobe.   Excreted contrast is seen within the renal collecting systems.    BONES: Within normal limits.    IMPRESSION:     Limited study. No large/central pulmonary embolism.    Mild cardiomegaly.

## 2017-02-11 NOTE — DISCHARGE NOTE ADULT - CARE PLAN
Principal Discharge DX:	Atrial fibrillation with rapid ventricular response  Goal:	Control of rate so that you no longer have palpitations, possible intervention if your heart does not revert to a normal sinus rhythm.  Instructions for follow-up, activity and diet:	USE CARDIZEM EVERY 8 HOURS, BUT MAY TAKE EXTRA DOSE AT BEDTIME IF YOU HAVE PALPITATIONS. SEE CARDIOLOGIST  ON 2/13/16  Secondary Diagnosis:	Obstructive sleep apnea hypopnea, moderate  Instructions for follow-up, activity and diet:	SEE PULMONOLOGIST AND ARRANGE FOR A SLEEP STUDY.  Secondary Diagnosis:	Rheumatoid arthritis, involving unspecified site, unspecified rheumatoid factor presence  Secondary Diagnosis:	Gastroesophageal reflux disease without esophagitis

## 2017-02-11 NOTE — PROGRESS NOTE ADULT - PROBLEM SELECTOR PROBLEM 1
Atrial fibrillation with rapid ventricular response

## 2017-02-11 NOTE — DISCHARGE NOTE ADULT - MEDICATION SUMMARY - MEDICATIONS TO TAKE
I will START or STAY ON the medications listed below when I get home from the hospital:    diltiaZEM 60 mg oral tablet  -- 1 tab(s) by mouth every 8 hours, As Needed for palpitations.  -- Indication: For ATRIAL FIBRILATION WITH RAPID VENTRICULAR RESPONSE    Xarelto 20 mg oral tablet  -- 1 tab(s) by mouth once a day (in the morning)  -- Indication: For ATRIAL FIBRILATION WITH RAPID VENTRICULAR RESPONSE    methotrexate 2.5 mg oral tablet  -- 6 tab(s) by mouth every 7 days - 0n Mondays  -- Indication: For RA (rheumatoid arthritis)    Protonix 40 mg oral delayed release tablet  -- 1 tab(s) by mouth once a day, As Needed  -- Indication: For Gastroesophageal reflux disease without esophagitis    folic acid 1 mg oral tablet  -- 1 tab(s) by mouth once a day  -- Indication: For vitamin

## 2017-02-11 NOTE — PROGRESS NOTE ADULT - SUBJECTIVE AND OBJECTIVE BOX
Patient is a 46y old  Female who presents with a chief complaint of Palpitations (10 Feb 2017 19:32)    HPI:  Pt. 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   Pt denies any CP, n/v/d/c no such sx's in past. no recent travel no trauma or sick contact.    TTE 7/16 (post PE)  EF 63%  CONCLUSIONS:  1. Normal mitral valve. Minimal mitral regurgitation.  2. Normal left ventricular internal dimensions and wall  thicknesses.  3. Normal left ventricular systolic function. No segmental  wall motion abnormalities.  4. Normal right ventricular size and systolic function.  5. Inadequate tricuspid regurgitation Doppler envelope  precludes estimation of RVSP.  --------------------------------------------------- (10 Feb 2017 06:42)       INTERVAL HPI/OVERNIGHT EVENTS: still having palpitations.  ALSO STATES  HAS TOLD HER RECETNLY THAT HE HAS HEARD HER SNORING, AND SHE HAS AWOKEN WITH SNORTING RESPIRATIONS A FEW TIMES RECENTLY. NEVER HAD SLEEP STUDY.        Allergies    sulfa drugs (Unknown)    Intolerances        ROS: all systems reviewed and wnl, except    Vital Signs Last 24 Hrs  T(C): 37, Max: 37.1 (02-10 @ 16:41)  T(F): 98.6, Max: 98.7 (02-10 @ 16:41)  HR: 58 (55 - 90)  BP: 129/69 (104/76 - 129/69)  BP(mean): --  RR: 18 (15 - 18)  SpO2: 95% (94% - 99%)  CAPILLARY BLOOD GLUCOSE            PHYSICAL EXAMINATION:  PHYSICAL EXAM:  GENERAL: NAD, well-groomed, well-developed  HEAD:  Atraumatic, Normocephalic  EYES: EOMI, PERRLA, conjunctiva and sclera clear  ENMT: No tonsillar erythema, exudates, or enlargement; Moist mucous membranes, Good dentition, No lesions  NECK: Supple, No JVD, no bruit  NERVOUS SYSTEM:  Alert & Oriented X3, Good concentration;   CHEST/LUNG: Clear bilaterally; No rales, rhonchi, wheezing, or rubs  HEART: Irregular rate and rhythm; No murmurs or rubs  ABDOMEN: Soft, Nontender, Nondistended; Bowel sounds present  EXTREMITIES:  2+ DP Pulses, No clubbing, cyanosis, or edema  SKIN: TKR scar          LABS:                        14.3   5.7   )-----------( 123      ( 11 Feb 2017 07:39 )             41.5     10 Feb 2017 02:08    145    |  108    |  21     ----------------------------<  109    4.2     |  27     |  0.85     Ca    8.8        10 Feb 2017 02:08    TPro  7.6    /  Alb  3.5    /  TBili  0.3    /  DBili  x      /  AST  21     /  ALT  29     /  AlkPhos  72     10 Feb 2017 02:08    PT/INR - ( 10 Feb 2017 02:08 )   PT: 10.5 sec;   INR: 0.94 ratio         PTT - ( 10 Feb 2017 10:56 )  PTT:55.5 sec    CARDIAC MARKERS ( 10 Feb 2017 15:40 )  .038 ng/mL / x     / 63 U/L / x     / 0.7 ng/mL  CARDIAC MARKERS ( 10 Feb 2017 08:48 )  .050 ng/mL / x     / 68 U/L / x     / <0.5 ng/mL  CARDIAC MARKERS ( 10 Feb 2017 02:08 )  <.015 ng/mL / x     / 84 U/L / x     / <0.5 ng/mL                RADIOLOGY & ADDITIONAL TESTS:        MEDICATIONS  (STANDING):  pantoprazole    Tablet 40milliGRAM(s) Oral before breakfast  diltiazem    Tablet 60milliGRAM(s) Oral three times a day  apixaban 5milliGRAM(s) Oral two times a day    MEDICATIONS  (PRN):        ASSESSMENT AND PLAN:  FRANCISCO FloresFemale  ATRIAL FIBRILATION WITH RAPID VENTRICULAR RESPONSE: ATRIAL FIBRILATION WITH RAPID VENTRICULAR RESPONSE  PALPITATIONS: PALPITATIONS  Uterine leiomyoma  GERD (gastroesophageal reflux disease)  Obesity  RA (rheumatoid arthritis)  No pertinent past medical history  Atrial fibrillation with rapid ventricular response: Atrial fibrillation with rapid ventricular response  Atrial fibrillation with rapid ventricular response  Preventive measure: Preventive measure  RA (rheumatoid arthritis): RA (rheumatoid arthritis)  Gastroesophageal reflux disease without esophagitis: Gastroesophageal reflux disease without esophagitis  Atrial fibrillation with rapid ventricular response: Atrial fibrillation with rapid ventricular response  Uterine leiomyoma  RA (rheumatoid arthritis): RA (rheumatoid arthritis)  Gastroesophageal reflux disease without esophagitis: Gastroesophageal reflux disease without esophagitis        Complete Blood Count + Automated Diff  Comprehensive Metabolic Panel  Prothrombin Time and INR, Plasma  Activated Partial Thromboplastin Time  sodium chloride 0.9% Bolus [completed]  Troponin I, Serum  CKMB Mass Assay  Creatine Kinase, Serum  12 Lead ECG  D-Dimer Assay, Quantitative  diltiazem Injectable [completed]  Xray Chest 1 View AP/PA.  HCG Quantitative, Serum  diltiazem Injectable [completed]  heparin  Infusion. [discontinued]  heparin  Injectable [completed]  heparin  Injectable [discontinued]  diltiazem Injectable [completed]  diltiazem    Tablet [discontinued]  Diet, Regular  (ADM OVERRIDE) [completed]  (ADM OVERRIDE) [completed]  (ADM OVERRIDE) [completed]  Creatine Kinase, Serum  CKMB Mass Assay  Troponin I, Serum  Thyroid Stimulating Hormone, Serum  Lipid Profile  apixaban [discontinued]  TTE Echo Doppler w/o Cont  pantoprazole    Tablet  diltiazem    Tablet  Thyroid Stimulating Hormone, Serum  (ADM OVERRIDE) [completed]  Activated Partial Thromboplastin Time  Complete Blood Count  Complete Blood Count  Troponin I, Serum  CKMB Mass Assay  Creatine Kinase, Serum  12 Lead ECG  CT Angio Chest w/ IV Cont  apixaban  (ADM OVERRIDE) [completed]  Complete Blood Count

## 2017-02-11 NOTE — PROGRESS NOTE ADULT - PROBLEM SELECTOR PLAN 3
Tylenol PRN pain  cont folic acid,  already took methotrexate for the week
Tylenol PRN pain  cont folic acid,  already took methotrexate for the week

## 2017-02-11 NOTE — DISCHARGE NOTE ADULT - PLAN OF CARE
USE CARDIZEM EVERY 8 HOURS, BUT MAY TAKE EXTRA DOSE AT BEDTIME IF YOU HAVE PALPITATIONS. SEE CARDIOLOGIST  ON 2/13/16 SEE PULMONOLOGIST AND ARRANGE FOR A SLEEP STUDY. Control of rate so that you no longer have palpitations, possible intervention if your heart does not revert to a normal sinus rhythm.

## 2017-02-11 NOTE — DISCHARGE NOTE ADULT - PROVIDER TOKENS
FREE:[LAST:[Cardiologist],PHONE:[(   )    -],FAX:[(   )    -]],FREE:[LAST:[Pulmonologist],PHONE:[(   )    -],FAX:[(   )    -]]

## 2017-02-11 NOTE — PROGRESS NOTE ADULT - PROBLEM SELECTOR PROBLEM 2
Gastroesophageal reflux disease without esophagitis
Gastroesophageal reflux disease without esophagitis

## 2017-02-11 NOTE — PROGRESS NOTE ADULT - PROBLEM SELECTOR PLAN 1
MEDICATIONS  (STANDING):  pantoprazole    Tablet 40milliGRAM(s) Oral before breakfast  diltiazem    Tablet 60milliGRAM(s) Oral three times a day  apixaban 5milliGRAM(s) Oral two times a day MEDICATIONS  (STANDING):  pantoprazole    Tablet 40milliGRAM(s) Oral before breakfast  diltiazem  mg daily with diltiazem 60 mg at night prn palps.  apixaban 5milliGRAM(s) Oral two times a day, swith to Xarelto 20 mg daily.  Ok for d/c from cardiac standpoint for outpatient f/u. Few days of off from work is sensible.

## 2017-02-11 NOTE — DISCHARGE NOTE ADULT - PATIENT PORTAL LINK FT
“You can access the Follow[facility] Patient Portal, offered by [facility], by registering with the following website: [email or web address]”

## 2017-02-11 NOTE — PROGRESS NOTE ADULT - ASSESSMENT
46 yr old female with known RA (on methotrexate), GERD and s/p L TKR in 7/16 complicated by PE. Completed 6 months of DOAC therapy, stopped 1 month ago. She was in usoh until day of admission when woke up with rapid irregular heart beats. When questioned , patient has noticed intermittent palpitations especially after moderate exertion [place] so it is unclear how long she may have had an arrhythmia. She is a RN at [employer].   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 No recent travel no trauma or sick contact. AF with RVR initially. Now better rate controlled. Prelim echo: normal BV size and systolic function. Mild MR/Mod TR, Mild LAE. 46 yr old female with known RA (on methotrexate), GERD and s/p L TKR in 7/16 complicated by PE. Completed 6 months of DOAC therapy, stopped 1 month ago. She was in usoh until day of admission when woke up with rapid irregular heart beats. When questioned , patient has noticed intermittent palpitations especially after moderate exertion [place] so it is unclear how long she may have had an arrhythmia. 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 No recent travel no trauma or sick contact. AF with RVR initially. Now better rate controlled. Prelim echo: normal BV size and systolic function. Mild MR/Mod TR, Mild LAE. I spoke with her about options including rate control vs. rhtyhm control. DCCCV also an option as outpatient.

## 2017-02-11 NOTE — PROGRESS NOTE ADULT - SUBJECTIVE AND OBJECTIVE BOX
HPI:  46 yr old female with known RA (on methotrexate), GERD and s/p L TKR in 7/16 complicated by PE. Completed 6 months of DOAC therapy, stopped 1 month ago. She was in usoh until day of asmission when woke up with rapid irregular heart beats. 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 No recent travel no trauma or sick contact. AF with RVR initially.     REVIEW OF SYSTEMS: Less SOB, AF better rate controlled.    CONSTITUTIONAL: No fever, weight loss, or fatigue  EYES: No eye pain, visual disturbances, or discharge  ENMT:  No difficulty hearing, tinnitus, vertigo; No sinus or throat pain  NECK: No pain or stiffness  BREASTS: No pain, masses, or nipple discharge  RESPIRATORY: No cough, wheezing, chills or hemoptysis; No shortness of breath  CARDIOVASCULAR: No chest pain, palpitations, dizziness, or leg swelling  GASTROINTESTINAL: No abdominal or epigastric pain. No nausea, vomiting, or hematemesis; No diarrhea or constipation. No melena or hematochezia.  GENITOURINARY: No dysuria, frequency, hematuria, or incontinence  NEUROLOGICAL: No headaches, memory loss, loss of strength, numbness, or tremors  SKIN: No itching, burning, rashes, or lesions   LYMPH NODES: No enlarged glands  ENDOCRINE: No heat or cold intolerance; No hair loss  MUSCULOSKELETAL: No joint pain or swelling; No muscle, back, or extremity pain  PSYCHIATRIC: No depression, anxiety, mood swings, or difficulty sleeping  HEME/LYMPH: No easy bruising, or bleeding gums  ALLERGY AND IMMUNOLOGIC: No hives or eczema      PHYSICAL EXAMINATION:  -----------------------------  Vital Signs Last 24 Hrs  T(C): 37, Max: 37.1 (02-10 @ 16:41)  T(F): 98.6, Max: 98.7 (02-10 @ 16:41)  HR: 61 (55 - 90)  BP: 109/64 (104/76 - 129/69)  RR: 18 (17 - 18)  SpO2: 95% (94% - 98%)      Constitutional: well developed, normal appearance, well groomed, well nourished, no deformities and no acute distress.   Eyes: the conjunctiva exhibited no abnormalities and the eyelids demonstrated no xanthelasmas.   HEENT: normal oral mucosa, no oral pallor and no oral cyanosis.   Neck: normal jugular venous A waves present, normal jugular venous V waves present and no jugular venous durbin A waves.   Pulmonary: no respiratory distress, normal respiratory rhythm and effort, no accessory muscle use and lungs were clear to auscultation bilaterally.   Cardiovascular: Irregular rate noted. normal S1 and S2 and no murmur, gallop, rub, heave or thrill are present.   Abdomen: soft, non-tender, no hepato-splenomegaly and no abdominal mass palpated.   Musculoskeletal: the gait could not be assessed..   Extremities: no clubbing of the fingernails, no localized cyanosis, no petechial hemorrhages and no ischemic changes.   Skin: normal skin color and pigmentation, no rash, no venous stasis, no skin lesions, no skin ulcer and no xanthoma was observed.   Psychiatric: oriented to person, place, and time, the affect was normal, the mood was normal and not feeling anxious.     LABS:   --------                        14.3   5.7   )-----------( 123      ( 11 Feb 2017 07:39 )             41.5     10 Feb 2017 02:08    145    |  108    |  21     ----------------------------<  109    4.2     |  27     |  0.85     Ca    8.8        10 Feb 2017 02:08    TPro  7.6    /  Alb  3.5    /  TBili  0.3    /  DBili  x      /  AST  21     /  ALT  29     /  AlkPhos  72     10 Feb 2017 02:08        RADIOLOGY:  -----------------    EXAM:  CHEST SINGLE VIEW                            PROCEDURE DATE:  02/10/2017        INTERPRETATION:  PROCEDURE: AP view of the chest.    CLINICAL INFORMATION: Palpitations    COMPARISON: 6/23/2016    FINDINGS:        There are no focal air space opacities. The cardiac and mediastinal   contours are prominent, which may be due to magnification from AP   technique and shallow inspiration. The osseous structures are intact.    IMPRESSION:    No focal air space opacities.        ECG: Afib, NSSTW changes, no old ecg for comp HPI:  46 yr old female with known RA (on methotrexate), GERD and s/p L TKR in 7/16 complicated by PE. Completed 6 months of DOAC therapy, stopped 1 month ago. She was in usoh until day of asmission when woke up with rapid irregular heart beats. When questioned , patient has noticed intermittent palpitations especially after moderate exertion [place] so it is unclear how long she may have had an arrhythmia.She is a RN at [employer].   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 No recent travel no trauma or sick contact. AF with RVR initially. Now better hr managed. Feels better. Some palpitations when walking the [place] noted.    REVIEW OF SYSTEMS: Less SOB, AF better rate controlled.    CONSTITUTIONAL: No fever, weight loss, or fatigue  EYES: No eye pain, visual disturbances, or discharge  ENMT:  No difficulty hearing, tinnitus, vertigo; No sinus or throat pain  NECK: No pain or stiffness  BREASTS: No pain, masses, or nipple discharge  RESPIRATORY: No cough, wheezing, chills or hemoptysis; No shortness of breath  CARDIOVASCULAR: No chest pain, palpitations, dizziness, or leg swelling  GASTROINTESTINAL: No abdominal or epigastric pain. No nausea, vomiting, or hematemesis; No diarrhea or constipation. No melena or hematochezia.  GENITOURINARY: No dysuria, frequency, hematuria, or incontinence  NEUROLOGICAL: No headaches, memory loss, loss of strength, numbness, or tremors  SKIN: No itching, burning, rashes, or lesions   LYMPH NODES: No enlarged glands  ENDOCRINE: No heat or cold intolerance; No hair loss  MUSCULOSKELETAL: No joint pain or swelling; No muscle, back, or extremity pain  PSYCHIATRIC: No depression, anxiety, mood swings, or difficulty sleeping  HEME/LYMPH: No easy bruising, or bleeding gums  ALLERGY AND IMMUNOLOGIC: No hives or eczema      PHYSICAL EXAMINATION:  -----------------------------  Vital Signs Last 24 Hrs  T(C): 37, Max: 37.1 (02-10 @ 16:41)  T(F): 98.6, Max: 98.7 (02-10 @ 16:41)  HR: 61 (55 - 90)  BP: 109/64 (104/76 - 129/69)  RR: 18 (17 - 18)  SpO2: 95% (94% - 98%)      Constitutional: well developed, normal appearance, well groomed, well nourished, no deformities and no acute distress.   Eyes: the conjunctiva exhibited no abnormalities and the eyelids demonstrated no xanthelasmas.   HEENT: normal oral mucosa, no oral pallor and no oral cyanosis.   Neck: normal jugular venous A waves present, normal jugular venous V waves present and no jugular venous durbin A waves.   Pulmonary: no respiratory distress, normal respiratory rhythm and effort, no accessory muscle use and lungs were clear to auscultation bilaterally.   Cardiovascular: Irregular rate noted. normal S1 and S2 and no murmur, gallop, rub, heave or thrill are present.   Abdomen: soft, non-tender, no hepato-splenomegaly and no abdominal mass palpated.   Musculoskeletal: the gait could not be assessed..   Extremities: no clubbing of the fingernails, no localized cyanosis, no petechial hemorrhages and no ischemic changes.   Skin: normal skin color and pigmentation, no rash, no venous stasis, no skin lesions, no skin ulcer and no xanthoma was observed.   Psychiatric: oriented to person, place, and time, the affect was normal, the mood was normal and not feeling anxious.     LABS:   --------                        14.3   5.7   )-----------( 123      ( 11 Feb 2017 07:39 )             41.5     10 Feb 2017 02:08    145    |  108    |  21     ----------------------------<  109    4.2     |  27     |  0.85     Ca    8.8        10 Feb 2017 02:08    TPro  7.6    /  Alb  3.5    /  TBili  0.3    /  DBili  x      /  AST  21     /  ALT  29     /  AlkPhos  72     10 Feb 2017 02:08        RADIOLOGY:  -----------------    EXAM:  CHEST SINGLE VIEW                            PROCEDURE DATE:  02/10/2017        INTERPRETATION:  PROCEDURE: AP view of the chest.    CLINICAL INFORMATION: Palpitations    COMPARISON: 6/23/2016    FINDINGS:        There are no focal air space opacities. The cardiac and mediastinal   contours are prominent, which may be due to magnification from AP   technique and shallow inspiration. The osseous structures are intact.    IMPRESSION:    No focal air space opacities.        ECG: Afib, NSSTW changes, no old ecg for comp

## 2017-02-11 NOTE — DISCHARGE NOTE ADULT - CARE PROVIDER_API CALL
Cardiologist,   Phone: (   )    -  Fax: (   )    -    Pulmonologist,   Phone: (   )    -  Fax: (   )    -

## 2017-02-11 NOTE — DISCHARGE NOTE ADULT - SECONDARY DIAGNOSIS.
Obstructive sleep apnea hypopnea, moderate Rheumatoid arthritis, involving unspecified site, unspecified rheumatoid factor presence Gastroesophageal reflux disease without esophagitis

## 2017-02-13 ENCOUNTER — TRANSCRIPTION ENCOUNTER (OUTPATIENT)
Age: 47
End: 2017-02-13

## 2017-02-14 ENCOUNTER — MESSAGE (OUTPATIENT)
Age: 47
End: 2017-02-14

## 2017-02-14 ENCOUNTER — TRANSCRIPTION ENCOUNTER (OUTPATIENT)
Age: 47
End: 2017-02-14

## 2017-02-14 ENCOUNTER — RX RENEWAL (OUTPATIENT)
Age: 47
End: 2017-02-14

## 2017-02-21 ENCOUNTER — OUTPATIENT (OUTPATIENT)
Dept: OUTPATIENT SERVICES | Facility: HOSPITAL | Age: 47
LOS: 1 days | End: 2017-02-21
Payer: COMMERCIAL

## 2017-02-21 ENCOUNTER — APPOINTMENT (OUTPATIENT)
Dept: CARDIOLOGY | Facility: CLINIC | Age: 47
End: 2017-02-21

## 2017-02-21 ENCOUNTER — NON-APPOINTMENT (OUTPATIENT)
Age: 47
End: 2017-02-21

## 2017-02-21 VITALS
WEIGHT: 244 LBS | HEART RATE: 70 BPM | SYSTOLIC BLOOD PRESSURE: 120 MMHG | HEIGHT: 68 IN | DIASTOLIC BLOOD PRESSURE: 91 MMHG | OXYGEN SATURATION: 98 % | BODY MASS INDEX: 36.98 KG/M2

## 2017-02-21 DIAGNOSIS — Z98.51 TUBAL LIGATION STATUS: Chronic | ICD-10-CM

## 2017-02-21 DIAGNOSIS — R00.2 PALPITATIONS: ICD-10-CM

## 2017-02-21 DIAGNOSIS — R07.9 CHEST PAIN, UNSPECIFIED: ICD-10-CM

## 2017-02-21 PROCEDURE — 93227 XTRNL ECG REC<48 HR R&I: CPT

## 2017-03-02 ENCOUNTER — TRANSCRIPTION ENCOUNTER (OUTPATIENT)
Age: 47
End: 2017-03-02

## 2017-03-13 ENCOUNTER — TRANSCRIPTION ENCOUNTER (OUTPATIENT)
Age: 47
End: 2017-03-13

## 2017-03-15 ENCOUNTER — TRANSCRIPTION ENCOUNTER (OUTPATIENT)
Age: 47
End: 2017-03-15

## 2017-04-04 NOTE — ED ADULT TRIAGE NOTE - WEIGHT IN LBS
Amlodipine 10mg      Last Written Prescription Date: 3/6/17  Last Fill Quantity: 30, # refills: 0    Last Office Visit with [facility], P or [facility] prescribing provider:  3/16/17   Future Office Visit:    Next 5 appointments (look out 90 days)     Apr 12, 2017  2:00 PM CDT   [facility] Mal Davis DPM   [facility]    [address] [place] [id number]   [phone]            May 11, 2017 11:00 AM CDT   Return Visit with Maria G Friedman PA-C   [facility]    [address] [place] [id number] [phone]                    BP Readings from Last 3 Encounters:   03/30/17 101/63   03/30/17 134/79   03/16/17 110/64     Prescription approved per FM Refill Protocol.  Henrietta Clifford, PharmD  [facility]  [phone]       244

## 2017-04-07 ENCOUNTER — APPOINTMENT (OUTPATIENT)
Dept: ORTHOPEDIC SURGERY | Facility: CLINIC | Age: 47
End: 2017-04-07

## 2017-04-07 VITALS
HEIGHT: 68 IN | DIASTOLIC BLOOD PRESSURE: 83 MMHG | HEART RATE: 72 BPM | SYSTOLIC BLOOD PRESSURE: 136 MMHG | WEIGHT: 244 LBS | BODY MASS INDEX: 36.98 KG/M2

## 2017-06-11 ENCOUNTER — RX RENEWAL (OUTPATIENT)
Age: 47
End: 2017-06-11

## 2017-06-14 ENCOUNTER — MEDICATION RENEWAL (OUTPATIENT)
Age: 47
End: 2017-06-14

## 2017-06-27 ENCOUNTER — NON-APPOINTMENT (OUTPATIENT)
Age: 47
End: 2017-06-27

## 2017-06-27 ENCOUNTER — APPOINTMENT (OUTPATIENT)
Dept: CARDIOLOGY | Facility: CLINIC | Age: 47
End: 2017-06-27

## 2017-06-27 VITALS
BODY MASS INDEX: 36.98 KG/M2 | RESPIRATION RATE: 16 BRPM | WEIGHT: 244 LBS | DIASTOLIC BLOOD PRESSURE: 87 MMHG | SYSTOLIC BLOOD PRESSURE: 125 MMHG | HEIGHT: 68 IN | OXYGEN SATURATION: 97 % | HEART RATE: 61 BPM

## 2017-06-28 ENCOUNTER — TRANSCRIPTION ENCOUNTER (OUTPATIENT)
Age: 47
End: 2017-06-28

## 2017-07-17 ENCOUNTER — APPOINTMENT (OUTPATIENT)
Dept: RHEUMATOLOGY | Facility: CLINIC | Age: 47
End: 2017-07-17

## 2017-07-17 VITALS
WEIGHT: 245 LBS | BODY MASS INDEX: 37.13 KG/M2 | HEART RATE: 62 BPM | TEMPERATURE: 98 F | HEIGHT: 68 IN | SYSTOLIC BLOOD PRESSURE: 137 MMHG | DIASTOLIC BLOOD PRESSURE: 87 MMHG | OXYGEN SATURATION: 99 %

## 2017-10-11 ENCOUNTER — APPOINTMENT (OUTPATIENT)
Dept: RHEUMATOLOGY | Facility: CLINIC | Age: 47
End: 2017-10-11

## 2017-12-06 ENCOUNTER — RESULT REVIEW (OUTPATIENT)
Age: 47
End: 2017-12-06

## 2017-12-18 ENCOUNTER — RX RENEWAL (OUTPATIENT)
Age: 47
End: 2017-12-18

## 2017-12-20 ENCOUNTER — MESSAGE (OUTPATIENT)
Age: 47
End: 2017-12-20

## 2017-12-24 ENCOUNTER — MOBILE ON CALL (OUTPATIENT)
Age: 47
End: 2017-12-24

## 2017-12-27 ENCOUNTER — RX RENEWAL (OUTPATIENT)
Age: 47
End: 2017-12-27

## 2018-01-10 ENCOUNTER — APPOINTMENT (OUTPATIENT)
Dept: RHEUMATOLOGY | Facility: CLINIC | Age: 48
End: 2018-01-10
Payer: COMMERCIAL

## 2018-01-10 PROCEDURE — 99214 OFFICE O/P EST MOD 30 MIN: CPT

## 2018-01-23 ENCOUNTER — NON-APPOINTMENT (OUTPATIENT)
Age: 48
End: 2018-01-23

## 2018-01-23 ENCOUNTER — APPOINTMENT (OUTPATIENT)
Dept: CARDIOLOGY | Facility: CLINIC | Age: 48
End: 2018-01-23
Payer: COMMERCIAL

## 2018-01-23 VITALS
OXYGEN SATURATION: 97 % | SYSTOLIC BLOOD PRESSURE: 117 MMHG | DIASTOLIC BLOOD PRESSURE: 85 MMHG | RESPIRATION RATE: 16 BRPM | HEART RATE: 67 BPM | HEIGHT: 68 IN | WEIGHT: 249 LBS | BODY MASS INDEX: 37.74 KG/M2

## 2018-01-23 PROCEDURE — 93000 ELECTROCARDIOGRAM COMPLETE: CPT

## 2018-01-23 PROCEDURE — 99214 OFFICE O/P EST MOD 30 MIN: CPT

## 2018-02-12 ENCOUNTER — APPOINTMENT (OUTPATIENT)
Dept: ELECTROPHYSIOLOGY | Facility: CLINIC | Age: 48
End: 2018-02-12
Payer: COMMERCIAL

## 2018-02-12 VITALS
BODY MASS INDEX: 37.05 KG/M2 | WEIGHT: 244.5 LBS | HEART RATE: 64 BPM | OXYGEN SATURATION: 96 % | SYSTOLIC BLOOD PRESSURE: 111 MMHG | DIASTOLIC BLOOD PRESSURE: 81 MMHG | HEIGHT: 68 IN

## 2018-02-12 DIAGNOSIS — Z79.01 LONG TERM (CURRENT) USE OF ANTICOAGULANTS: ICD-10-CM

## 2018-02-12 PROCEDURE — 99205 OFFICE O/P NEW HI 60 MIN: CPT

## 2018-02-12 PROCEDURE — 93000 ELECTROCARDIOGRAM COMPLETE: CPT

## 2018-05-14 ENCOUNTER — APPOINTMENT (OUTPATIENT)
Dept: RHEUMATOLOGY | Facility: CLINIC | Age: 48
End: 2018-05-14

## 2018-05-24 ENCOUNTER — RX RENEWAL (OUTPATIENT)
Age: 48
End: 2018-05-24

## 2018-05-28 ENCOUNTER — TRANSCRIPTION ENCOUNTER (OUTPATIENT)
Age: 48
End: 2018-05-28

## 2018-06-28 ENCOUNTER — APPOINTMENT (OUTPATIENT)
Dept: RHEUMATOLOGY | Facility: CLINIC | Age: 48
End: 2018-06-28
Payer: COMMERCIAL

## 2018-06-28 VITALS
WEIGHT: 233 LBS | HEART RATE: 60 BPM | OXYGEN SATURATION: 97 % | DIASTOLIC BLOOD PRESSURE: 82 MMHG | BODY MASS INDEX: 35.31 KG/M2 | TEMPERATURE: 98 F | HEIGHT: 68 IN | SYSTOLIC BLOOD PRESSURE: 117 MMHG

## 2018-06-28 PROCEDURE — 99214 OFFICE O/P EST MOD 30 MIN: CPT

## 2018-07-11 ENCOUNTER — RX RENEWAL (OUTPATIENT)
Age: 48
End: 2018-07-11

## 2018-07-24 ENCOUNTER — APPOINTMENT (OUTPATIENT)
Dept: CARDIOLOGY | Facility: CLINIC | Age: 48
End: 2018-07-24

## 2018-07-27 ENCOUNTER — RX RENEWAL (OUTPATIENT)
Age: 48
End: 2018-07-27

## 2018-08-31 NOTE — H&P ADULT. - PROBLEM/PLAN-1
your back pain. You may feel:  · Pain that's sharp or dull. It may be in one small area or over a broad area. But even bad pain doesn't mean that it's caused by something serious. · Leg pain, numbness, or tingling. When a nerve gets squeezed-such as from a disc problem or arthritis-you may have symptoms in your leg or foot. You can even have leg symptoms from a back problem without having any pain in your back. How is low back pain diagnosed? A physical exam is the main way to diagnose low back pain. Your doctor may examine your back, check your nerves by testing your reflexes, and make sure that your muscles are strong. He or she also will ask questions about your back and overall health. Most people don't need any tests right away. Tests often don't show the reason for your pain. If your pain lasts more than 6 weeks or you have symptoms that your doctor is more concerned about, he or she may order tests. These may include an X-ray, a CT scan, or an MRI. In some cases, tests can help your doctor find a cause for your pain, especially for pain in one or both legs. How is low back pain treated? Most acute low back pain gets better on its own within a few weeks, no matter what the cause. Time and doing usual activities are all that most people need to feel better. Using heat or ice and taking over-the-counter pain medicine also can help while your body heals. If you aren't getting better on your own or your pain is very bad, your doctor may recommend:  · Physical therapy. · Spinal manipulation, such as by a chiropractor. · Acupuncture. · Massage. · Injections of steroid medicine in your back (especially for pain that involves your legs). If you have chronic low back pain, treatment will help you understand and manage your pain. Treatment may include:  · Staying active. This may include walking or doing back exercises. · Physical therapy. · Medicines.  Some of these medicines are also used for other Ease off the exercise if you start to have pain. Your doctor or physical therapist will tell you when you can start these exercises and which ones will work best for you. How to do the exercises  Press-up    1. Lie on your stomach, supporting your body with your forearms. 2. Press your elbows down into the floor to raise your upper back. As you do this, relax your stomach muscles and allow your back to arch without using your back muscles. As your press up, do not let your hips or pelvis come off the floor. 3. Hold for 15 to 30 seconds, then relax. 4. Repeat 2 to 4 times. Alternate arm and leg (bird dog) exercise    Do this exercise slowly. Try to keep your body straight at all times, and do not let one hip drop lower than the other. 1. Start on the floor, on your hands and knees. 2. Tighten your belly muscles. 3. Raise one leg off the floor, and hold it straight out behind you. Be careful not to let your hip drop down, because that will twist your trunk. 4. Hold for about 6 seconds, then lower your leg and switch to the other leg. 5. Repeat 8 to 12 times on each leg. 6. Over time, work up to holding for 10 to 30 seconds each time. 7. If you feel stable and secure with your leg raised, try raising the opposite arm straight out in front of you at the same time. Knee-to-chest exercise    1. Lie on your back with your knees bent and your feet flat on the floor. 2. Bring one knee to your chest, keeping the other foot flat on the floor (or keeping the other leg straight, whichever feels better on your lower back). 3. Keep your lower back pressed to the floor. Hold for at least 15 to 30 seconds. 4. Relax, and lower the knee to the starting position. 5. Repeat with the other leg. Repeat 2 to 4 times with each leg. 6. To get more stretch, put your other leg flat on the floor while pulling your knee to your chest.  Curl-ups    1. Lie on the floor on your back with your knees bent at a 90-degree angle.  Your feet should be flat on the floor, about 12 inches from your buttocks. 2. Cross your arms over your chest. If this bothers your neck, try putting your hands behind your neck (not your head), with your elbows spread apart. 3. Slowly tighten your belly muscles and raise your shoulder blades off the floor. 4. Keep your head in line with your body, and do not press your chin to your chest.  5. Hold this position for 1 or 2 seconds, then slowly lower yourself back down to the floor. 6. Repeat 8 to 12 times. Pelvic tilt exercise    1. Lie on your back with your knees bent. 2. \"Brace\" your stomach. This means to tighten your muscles by pulling in and imagining your belly button moving toward your spine. You should feel like your back is pressing to the floor and your hips and pelvis are rocking back. 3. Hold for about 6 seconds while you breathe smoothly. 4. Repeat 8 to 12 times. Heel dig bridging    1. Lie on your back with both knees bent and your ankles bent so that only your heels are digging into the floor. Your knees should be bent about 90 degrees. 2. Then push your heels into the floor, squeeze your buttocks, and lift your hips off the floor until your shoulders, hips, and knees are all in a straight line. 3. Hold for about 6 seconds as you continue to breathe normally, and then slowly lower your hips back down to the floor and rest for up to 10 seconds. 4. Do 8 to 12 repetitions. Hamstring stretch in doorway    1. Lie on your back in a doorway, with one leg through the open door. 2. Slide your leg up the wall to straighten your knee. You should feel a gentle stretch down the back of your leg. 3. Hold the stretch for at least 15 to 30 seconds. Do not arch your back, point your toes, or bend either knee. Keep one heel touching the floor and the other heel touching the wall. 4. Repeat with your other leg. 5. Do 2 to 4 times for each leg. Hip flexor stretch    1.  Kneel on the floor with one knee bent and one leg behind you. Place your forward knee over your foot. Keep your other knee touching the floor. 2. Slowly push your hips forward until you feel a stretch in the upper thigh of your rear leg. 3. Hold the stretch for at least 15 to 30 seconds. Repeat with your other leg. 4. Do 2 to 4 times on each side. Wall sit    1. Stand with your back 10 to 12 inches away from a wall. 2. Lean into the wall until your back is flat against it. 3. Slowly slide down until your knees are slightly bent, pressing your lower back into the wall. 4. Hold for about 6 seconds, then slide back up the wall. 5. Repeat 8 to 12 times. Follow-up care is a key part of your treatment and safety. Be sure to make and go to all appointments, and call your doctor if you are having problems. It's also a good idea to know your test results and keep a list of the medicines you take. Where can you learn more? Go to [email or web address] org and sign in to your [employer] account. Enter B089 in the Emgo box to learn more about \"Low Back Pain: Exercises. \"     If you do not have an account, please click on the \"Sign Up Now\" link. Current as of: November 29, 2017  Content Version: 11.7  © 6863-9682 [employer]. Care instructions adapted under license by [facility] ([facility]). If you have questions about a medical condition or this instruction, always ask your healthcare professional. [place] [address] any warranty or liability for your use of this information. DISPLAY PLAN FREE TEXT

## 2018-09-07 ENCOUNTER — RX RENEWAL (OUTPATIENT)
Age: 48
End: 2018-09-07

## 2018-09-28 ENCOUNTER — APPOINTMENT (OUTPATIENT)
Dept: RHEUMATOLOGY | Facility: CLINIC | Age: 48
End: 2018-09-28

## 2018-11-13 ENCOUNTER — NON-APPOINTMENT (OUTPATIENT)
Age: 48
End: 2018-11-13

## 2018-11-13 ENCOUNTER — APPOINTMENT (OUTPATIENT)
Dept: RHEUMATOLOGY | Facility: CLINIC | Age: 48
End: 2018-11-13
Payer: COMMERCIAL

## 2018-11-13 ENCOUNTER — APPOINTMENT (OUTPATIENT)
Dept: CARDIOLOGY | Facility: CLINIC | Age: 48
End: 2018-11-13
Payer: COMMERCIAL

## 2018-11-13 VITALS
HEART RATE: 67 BPM | DIASTOLIC BLOOD PRESSURE: 83 MMHG | TEMPERATURE: 98.2 F | OXYGEN SATURATION: 98 % | SYSTOLIC BLOOD PRESSURE: 126 MMHG | HEIGHT: 68 IN | RESPIRATION RATE: 16 BRPM | BODY MASS INDEX: 34.86 KG/M2 | WEIGHT: 230 LBS

## 2018-11-13 PROCEDURE — 99214 OFFICE O/P EST MOD 30 MIN: CPT

## 2018-11-13 PROCEDURE — 93000 ELECTROCARDIOGRAM COMPLETE: CPT

## 2018-11-13 RX ORDER — DILTIAZEM HYDROCHLORIDE 120 MG/1
120 CAPSULE, EXTENDED RELEASE ORAL DAILY
Qty: 90 | Refills: 3 | Status: DISCONTINUED | COMMUNITY
Start: 2017-02-21 | End: 2018-11-13

## 2018-11-13 NOTE — ASSESSMENT
[FreeTextEntry1] : Atrial fibrillation. Continue Xarelto. She wishes to pursue ablation in April.  Continue diltiazem.\par

## 2018-11-13 NOTE — PHYSICAL EXAM
[General Appearance - Well Developed] : well developed [Normal Appearance] : normal appearance [Well Groomed] : well groomed [General Appearance - Well Nourished] : well nourished [No Deformities] : no deformities [General Appearance - In No Acute Distress] : no acute distress [Normal Conjunctiva] : the conjunctiva exhibited no abnormalities [Eyelids - No Xanthelasma] : the eyelids demonstrated no xanthelasmas [Normal Oral Mucosa] : normal oral mucosa [No Oral Pallor] : no oral pallor [No Oral Cyanosis] : no oral cyanosis [Normal Jugular Venous A Waves Present] : normal jugular venous A waves present [Normal Jugular Venous V Waves Present] : normal jugular venous V waves present [No Jugular Venous Nielsen A Waves] : no jugular venous nielsen A waves [Respiration, Rhythm And Depth] : normal respiratory rhythm and effort [Exaggerated Use Of Accessory Muscles For Inspiration] : no accessory muscle use [Auscultation Breath Sounds / Voice Sounds] : lungs were clear to auscultation bilaterally [Heart Rate And Rhythm] : heart rate and rhythm were normal [Heart Sounds] : normal S1 and S2 [Murmurs] : no murmurs present [Abdomen Soft] : soft [Abdomen Tenderness] : non-tender [Abdomen Mass (___ Cm)] : no abdominal mass palpated [Abnormal Walk] : normal gait [Gait - Sufficient For Exercise Testing] : the gait was sufficient for exercise testing [Nail Clubbing] : no clubbing of the fingernails [Cyanosis, Localized] : no localized cyanosis [Petechial Hemorrhages (___cm)] : no petechial hemorrhages [Skin Color & Pigmentation] : normal skin color and pigmentation [] : no rash [No Venous Stasis] : no venous stasis [Skin Lesions] : no skin lesions [No Skin Ulcers] : no skin ulcer [No Xanthoma] : no  xanthoma was observed [Oriented To Time, Place, And Person] : oriented to person, place, and time [Affect] : the affect was normal [Mood] : the mood was normal [No Anxiety] : not feeling anxious

## 2018-11-13 NOTE — REASON FOR VISIT
[Follow-Up - Clinic] : a clinic follow-up of [Atrial Fibrillation] : atrial fibrillation [FreeTextEntry1] : Since her last visit, the patient has had occasional palpitations. She has been under significant stress, with the death of her sister. She has had no chest pain or shortness of breath, but has not been exercising regularly.\par \par She recently switched jobs, and wishes to pursue ablation in April when she was built up enough sick time.\par \par 1. Atrial fibrillation. Continue Xarelto. She wishes to pursue ablation in April.  Continue diltiazem.\par

## 2018-11-14 LAB
ALBUMIN SERPL ELPH-MCNC: 3.9 G/DL
ALP BLD-CCNC: 68 U/L
ALT SERPL-CCNC: 19 U/L
ANION GAP SERPL CALC-SCNC: 12 MMOL/L
AST SERPL-CCNC: 16 U/L
BASOPHILS # BLD AUTO: 0.03 K/UL
BASOPHILS NFR BLD AUTO: 0.5 %
BILIRUB SERPL-MCNC: 0.4 MG/DL
BUN SERPL-MCNC: 19 MG/DL
CALCIUM SERPL-MCNC: 9.6 MG/DL
CHLORIDE SERPL-SCNC: 107 MMOL/L
CO2 SERPL-SCNC: 25 MMOL/L
CREAT SERPL-MCNC: 0.78 MG/DL
CRP SERPL-MCNC: 0.11 MG/DL
EOSINOPHIL # BLD AUTO: 0.07 K/UL
EOSINOPHIL NFR BLD AUTO: 1.2 %
ERYTHROCYTE [SEDIMENTATION RATE] IN BLOOD BY WESTERGREN METHOD: 8 MM/HR
GLUCOSE SERPL-MCNC: 76 MG/DL
HCT VFR BLD CALC: 45.6 %
HGB BLD-MCNC: 14.5 G/DL
IMM GRANULOCYTES NFR BLD AUTO: 0.2 %
LYMPHOCYTES # BLD AUTO: 1.55 K/UL
LYMPHOCYTES NFR BLD AUTO: 25.7 %
MAN DIFF?: NORMAL
MCHC RBC-ENTMCNC: 30 PG
MCHC RBC-ENTMCNC: 31.8 GM/DL
MCV RBC AUTO: 94.4 FL
MONOCYTES # BLD AUTO: 0.33 K/UL
MONOCYTES NFR BLD AUTO: 5.5 %
NEUTROPHILS # BLD AUTO: 4.03 K/UL
NEUTROPHILS NFR BLD AUTO: 66.9 %
PLATELET # BLD AUTO: 152 K/UL
POTASSIUM SERPL-SCNC: 4.2 MMOL/L
PROT SERPL-MCNC: 6.7 G/DL
RBC # BLD: 4.83 M/UL
RBC # FLD: 14.4 %
SODIUM SERPL-SCNC: 144 MMOL/L
WBC # FLD AUTO: 6.02 K/UL

## 2019-01-16 ENCOUNTER — APPOINTMENT (OUTPATIENT)
Dept: RHEUMATOLOGY | Facility: CLINIC | Age: 49
End: 2019-01-16

## 2019-01-16 ENCOUNTER — APPOINTMENT (OUTPATIENT)
Dept: RHEUMATOLOGY | Facility: CLINIC | Age: 49
End: 2019-01-16
Payer: COMMERCIAL

## 2019-01-16 VITALS
SYSTOLIC BLOOD PRESSURE: 124 MMHG | DIASTOLIC BLOOD PRESSURE: 81 MMHG | WEIGHT: 230 LBS | TEMPERATURE: 98.7 F | HEART RATE: 69 BPM | OXYGEN SATURATION: 98 % | BODY MASS INDEX: 34.86 KG/M2 | RESPIRATION RATE: 16 BRPM | HEIGHT: 68 IN

## 2019-01-16 PROCEDURE — 99214 OFFICE O/P EST MOD 30 MIN: CPT

## 2019-01-18 LAB
ALBUMIN SERPL ELPH-MCNC: 4 G/DL
ALP BLD-CCNC: 74 U/L
ALT SERPL-CCNC: 23 U/L
ANION GAP SERPL CALC-SCNC: 12 MMOL/L
AST SERPL-CCNC: 20 U/L
BASOPHILS # BLD AUTO: 0.03 K/UL
BASOPHILS NFR BLD AUTO: 0.6 %
BILIRUB SERPL-MCNC: 0.2 MG/DL
BUN SERPL-MCNC: 18 MG/DL
CALCIUM SERPL-MCNC: 9.3 MG/DL
CHLORIDE SERPL-SCNC: 105 MMOL/L
CO2 SERPL-SCNC: 22 MMOL/L
CREAT SERPL-MCNC: 0.64 MG/DL
CRP SERPL-MCNC: 0.21 MG/DL
EOSINOPHIL # BLD AUTO: 0.1 K/UL
EOSINOPHIL NFR BLD AUTO: 2 %
ERYTHROCYTE [SEDIMENTATION RATE] IN BLOOD BY WESTERGREN METHOD: 24 MM/HR
GLUCOSE SERPL-MCNC: 60 MG/DL
HBV CORE IGG+IGM SER QL: NONREACTIVE
HBV CORE IGM SER QL: NONREACTIVE
HBV SURFACE AB SER QL: REACTIVE
HBV SURFACE AG SER QL: NONREACTIVE
HCT VFR BLD CALC: 44 %
HCV AB SER QL: NONREACTIVE
HCV S/CO RATIO: 0.4 S/CO
HGB BLD-MCNC: 13.8 G/DL
IMM GRANULOCYTES NFR BLD AUTO: 0.2 %
LYMPHOCYTES # BLD AUTO: 1.86 K/UL
LYMPHOCYTES NFR BLD AUTO: 37.3 %
M TB IFN-G BLD-IMP: NEGATIVE
MAN DIFF?: NORMAL
MCHC RBC-ENTMCNC: 29.1 PG
MCHC RBC-ENTMCNC: 31.4 GM/DL
MCV RBC AUTO: 92.8 FL
MONOCYTES # BLD AUTO: 0.36 K/UL
MONOCYTES NFR BLD AUTO: 7.2 %
NEUTROPHILS # BLD AUTO: 2.63 K/UL
NEUTROPHILS NFR BLD AUTO: 52.7 %
PLATELET # BLD AUTO: 150 K/UL
POTASSIUM SERPL-SCNC: 5.1 MMOL/L
PROT SERPL-MCNC: 7.1 G/DL
QUANTIFERON TB PLUS MITOGEN MINUS NIL: 7.67 IU/ML
QUANTIFERON TB PLUS NIL: 0.05 IU/ML
QUANTIFERON TB PLUS TB1 MINUS NIL: 0.06 IU/ML
QUANTIFERON TB PLUS TB2 MINUS NIL: 0.05 IU/ML
RBC # BLD: 4.74 M/UL
RBC # FLD: 14.8 %
SODIUM SERPL-SCNC: 139 MMOL/L
WBC # FLD AUTO: 4.99 K/UL

## 2019-04-02 ENCOUNTER — RX RENEWAL (OUTPATIENT)
Age: 49
End: 2019-04-02

## 2019-04-11 ENCOUNTER — RX RENEWAL (OUTPATIENT)
Age: 49
End: 2019-04-11

## 2019-04-22 ENCOUNTER — RX RENEWAL (OUTPATIENT)
Age: 49
End: 2019-04-22

## 2019-05-01 ENCOUNTER — APPOINTMENT (OUTPATIENT)
Dept: RHEUMATOLOGY | Facility: CLINIC | Age: 49
End: 2019-05-01
Payer: COMMERCIAL

## 2019-05-01 DIAGNOSIS — M19.031 PRIMARY OSTEOARTHRITIS, RIGHT WRIST: ICD-10-CM

## 2019-05-01 PROCEDURE — 99215 OFFICE O/P EST HI 40 MIN: CPT

## 2019-05-02 ENCOUNTER — RX RENEWAL (OUTPATIENT)
Age: 49
End: 2019-05-02

## 2019-05-02 PROBLEM — M19.031 ARTHRITIS OF RIGHT WRIST: Status: ACTIVE | Noted: 2019-05-02

## 2019-05-02 RX ORDER — TOFACITINIB 11 MG/1
11 TABLET, FILM COATED, EXTENDED RELEASE ORAL
Qty: 90 | Refills: 1 | Status: DISCONTINUED | COMMUNITY
Start: 2019-01-18 | End: 2019-05-02

## 2019-05-07 ENCOUNTER — RX RENEWAL (OUTPATIENT)
Age: 49
End: 2019-05-07

## 2019-05-08 NOTE — ASSESSMENT
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

## 2019-05-08 NOTE — HISTORY OF PRESENT ILLNESS
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 2 hours\par = saw PCP, blood test was done , + RD high titer\par

## 2019-05-08 NOTE — PHYSICAL EXAM
[General Appearance - Alert] : alert [General Appearance - Well Nourished] : well nourished [General Appearance - Well Developed] : well developed [Sclera] : the sclera and conjunctiva were normal [Nasal Cavity] : the nasal mucosa and septum were normal [Auscultation Breath Sounds / Voice Sounds] : lungs were clear to auscultation bilaterally [Heart Sounds] : normal S1 and S2 [Murmurs] : no murmurs [Edema] : there was no peripheral edema [Bowel Sounds] : normal bowel sounds [Abdomen Soft] : soft [Abdomen Tenderness] : non-tender [Cervical Lymph Nodes Enlarged Anterior Bilaterally] : anterior cervical [Axillary Lymph Nodes Enlarged Bilaterally] : axillary [] : no rash [Sensation] : the sensory exam was normal to light touch and pinprick [Motor Exam] : the motor exam was normal [Oriented To Time, Place, And Person] : oriented to person, place, and time [Impaired Insight] : insight and judgment were intact [FreeTextEntry1] :  + right wrist swelling/ synovitis and tenderness, pain on dorsiflexion , 2, 3rd MCP swelling and tenderness, + tinels and Phalen's on right; other joints- no  swelling or tenderness, no knee effusion

## 2019-05-13 ENCOUNTER — RX RENEWAL (OUTPATIENT)
Age: 49
End: 2019-05-13

## 2019-05-31 ENCOUNTER — APPOINTMENT (OUTPATIENT)
Dept: ORTHOPEDIC SURGERY | Facility: CLINIC | Age: 49
End: 2019-05-31
Payer: COMMERCIAL

## 2019-05-31 VITALS
WEIGHT: 235 LBS | HEART RATE: 73 BPM | HEIGHT: 68 IN | BODY MASS INDEX: 35.61 KG/M2 | SYSTOLIC BLOOD PRESSURE: 128 MMHG | DIASTOLIC BLOOD PRESSURE: 86 MMHG

## 2019-05-31 DIAGNOSIS — M25.061 HEMARTHROSIS, RIGHT KNEE: ICD-10-CM

## 2019-05-31 DIAGNOSIS — Z96.651 PRESENCE OF RIGHT ARTIFICIAL KNEE JOINT: ICD-10-CM

## 2019-05-31 PROCEDURE — 99213 OFFICE O/P EST LOW 20 MIN: CPT

## 2019-05-31 PROCEDURE — 73562 X-RAY EXAM OF KNEE 3: CPT | Mod: RT

## 2019-05-31 NOTE — DISCUSSION/SUMMARY
[de-identified] : s/p right total knee replacement, with resolving hemarthrosis and scar tissue formation, following a recent fall six weeks ago. \par She will continue with icing and elevation of the knee to reduce swelling and inflammation. She has been recommended for physical therapy, for muscle strengthening\par She will follow up annually or earlier if symptoms continue.

## 2019-05-31 NOTE — HISTORY OF PRESENT ILLNESS
[Worsening] : worsening [Constant] : seem to be constant [All Hx] : past medical, family, and social [All] : medication and allergy [All Other ROS Normal] : All other review of systems are negative except as noted [Joint Pain] : joint pain [Joint Stiffness] : joint stiffness [Muscle Aches] : muscle aches [FreeTextEntry1] : s/p right total knee replacement 6/20/2016, right knee pain following a fall about six weeks ago.  [FreeTextEntry2] : Ms. JOEY WONG is a 49 year old female presents s/p right total knee replacement in 2016, with recent fall onto her knees. She states she was walking on uneven ground, and tripped falling forward, landing on both knees, right more than left. She states she is now having pain, sharp, along the anterior portion of the knee, with a palpable mass along the anterior portion of the right knee, that was not there prior to the fall. She was seen in a urgent care, where xrays were taken. She continues to have pain walking, standing, and climbing stairs, along with direct pressure on the knee, and therefore returns to the office for evaluation\par She is taking over the counter medications.

## 2019-05-31 NOTE — PHYSICAL EXAM
[Antalgic] : antalgic [Poor Appearance] : well-appearing [Acute Distress] : not in acute distress [Obese] : not obese [FreeTextEntry2] : On general examination the patient is adequately groomed and nourished. The vital parameters are as recorded. \par There is no lymphedema or diffuse swelling, no varicose veins, no skin warmth/erythema/scars/swelling, no ulcers and no palpable lymph nodes or masses in both lower extremities. Bilateral pedal pulses are well palpable.\par Upper Extremity:\par Both right and left upper extremities are unremarkable in terms of skin rash, lesions, pigmentation, redness, tenderness, swelling, joint instability, abnormal deformity or crepitus. The overall range of motion, sensation, motor tone and strength testing are normal.\par \par right Knee: Walks with a right stiff knee gait. There is a well-healed scar surgery with no significant swelling, redness or tenderness. There is mild swelling. There is a valgus alignment of 5°, no effusion, active straight leg raise of 60° and knee range of motion of 0-120° with good stability alignment and quadriceps grade 4 power.\par  [de-identified] : On general examination the patient is adequately groomed and nourished. The vital parameters are as recorded. \par There is no lymphedema or diffuse swelling, no varicose veins, no skin warmth/erythema/scars/swelling, no ulcers and no palpable lymph nodes or masses in both lower extremities. Bilateral pedal pulses are well palpable.\par Upper Extremity:\par Both right and left upper extremities are unremarkable in terms of skin rash, lesions, pigmentation, redness, tenderness, swelling, joint instability, abnormal deformity or crepitus. The overall range of motion, sensation, motor tone and strength testing are normal.\par \par RIGHT Knee: Walks with a right stiff knee gait.  There is a well-healed scar surgery with no sign of infection. There is a palpable region along the mid incision, consistent with scar tissue formation secondary to hemarthrosis. There is swelling of the knee, generalized, consistent with resolving hemarthrosis. There is a valgus alignment of 5°, no effusion, active straight leg raise of 60° and knee range of motion of 0-115° with good stability alignment and quadriceps grade 4 power.\par \par Neurology:\par The patient is alert and oriented in person, place and time. The mood is calm and affect is normal.\par Testing for coordination including Rhomberg's Test and Finger-Nose Test, sensation, motor tone and power and deep tendon reflexes in both lower extremities is normal.\par  [de-identified] : The following radiographs were ordered and read by me during this patients visit. I reviewed each radiograph in detail with the patient and discussed the findings as highlighted below.\par AP, lateral and skyline views of the right knee taken today reveal a well fixed and aligned right total knee replacement with no evidence of mechanical failure or periprosthetic fracture. There is no change when compared to previous.

## 2019-07-16 ENCOUNTER — APPOINTMENT (OUTPATIENT)
Dept: RHEUMATOLOGY | Facility: CLINIC | Age: 49
End: 2019-07-16
Payer: COMMERCIAL

## 2019-07-16 VITALS
WEIGHT: 235 LBS | SYSTOLIC BLOOD PRESSURE: 131 MMHG | DIASTOLIC BLOOD PRESSURE: 85 MMHG | OXYGEN SATURATION: 98 % | BODY MASS INDEX: 35.73 KG/M2 | TEMPERATURE: 98.1 F | HEART RATE: 63 BPM

## 2019-07-16 PROCEDURE — 99214 OFFICE O/P EST MOD 30 MIN: CPT

## 2019-07-17 LAB
ALBUMIN SERPL ELPH-MCNC: 4.1 G/DL
ALP BLD-CCNC: 67 U/L
ALT SERPL-CCNC: 17 U/L
ANION GAP SERPL CALC-SCNC: 12 MMOL/L
AST SERPL-CCNC: 20 U/L
BASOPHILS # BLD AUTO: 0.05 K/UL
BASOPHILS NFR BLD AUTO: 0.9 %
BILIRUB SERPL-MCNC: 0.5 MG/DL
BUN SERPL-MCNC: 23 MG/DL
CALCIUM SERPL-MCNC: 9.7 MG/DL
CHLORIDE SERPL-SCNC: 108 MMOL/L
CO2 SERPL-SCNC: 24 MMOL/L
CREAT SERPL-MCNC: 1.09 MG/DL
CRP SERPL-MCNC: <0.1 MG/DL
EOSINOPHIL # BLD AUTO: 0.12 K/UL
EOSINOPHIL NFR BLD AUTO: 2.1 %
ERYTHROCYTE [SEDIMENTATION RATE] IN BLOOD BY WESTERGREN METHOD: 23 MM/HR
GLUCOSE SERPL-MCNC: 75 MG/DL
HCT VFR BLD CALC: 44.4 %
HGB BLD-MCNC: 13.5 G/DL
IMM GRANULOCYTES NFR BLD AUTO: 0.2 %
LYMPHOCYTES # BLD AUTO: 1.59 K/UL
LYMPHOCYTES NFR BLD AUTO: 27.9 %
MAN DIFF?: NORMAL
MCHC RBC-ENTMCNC: 29.5 PG
MCHC RBC-ENTMCNC: 30.4 GM/DL
MCV RBC AUTO: 97.2 FL
MONOCYTES # BLD AUTO: 0.41 K/UL
MONOCYTES NFR BLD AUTO: 7.2 %
NEUTROPHILS # BLD AUTO: 3.51 K/UL
NEUTROPHILS NFR BLD AUTO: 61.7 %
PLATELET # BLD AUTO: 148 K/UL
POTASSIUM SERPL-SCNC: 4.3 MMOL/L
PROT SERPL-MCNC: 6.9 G/DL
RBC # BLD: 4.57 M/UL
RBC # FLD: 14.5 %
SODIUM SERPL-SCNC: 143 MMOL/L
WBC # FLD AUTO: 5.69 K/UL

## 2019-08-01 ENCOUNTER — RX RENEWAL (OUTPATIENT)
Age: 49
End: 2019-08-01

## 2019-08-12 ENCOUNTER — TRANSCRIPTION ENCOUNTER (OUTPATIENT)
Age: 49
End: 2019-08-12

## 2019-08-12 ENCOUNTER — RX RENEWAL (OUTPATIENT)
Age: 49
End: 2019-08-12

## 2019-08-12 ENCOUNTER — APPOINTMENT (OUTPATIENT)
Dept: ORTHOPEDIC SURGERY | Facility: CLINIC | Age: 49
End: 2019-08-12
Payer: COMMERCIAL

## 2019-08-12 VITALS
BODY MASS INDEX: 35.31 KG/M2 | HEIGHT: 68 IN | SYSTOLIC BLOOD PRESSURE: 128 MMHG | HEART RATE: 79 BPM | WEIGHT: 233 LBS | DIASTOLIC BLOOD PRESSURE: 81 MMHG

## 2019-08-12 PROCEDURE — 99214 OFFICE O/P EST MOD 30 MIN: CPT | Mod: 25

## 2019-08-12 PROCEDURE — 20526 THER INJECTION CARP TUNNEL: CPT | Mod: RT

## 2019-08-13 ENCOUNTER — TRANSCRIPTION ENCOUNTER (OUTPATIENT)
Age: 49
End: 2019-08-13

## 2019-08-13 ENCOUNTER — RX RENEWAL (OUTPATIENT)
Age: 49
End: 2019-08-13

## 2019-08-15 ENCOUNTER — TRANSCRIPTION ENCOUNTER (OUTPATIENT)
Age: 49
End: 2019-08-15

## 2019-08-19 ENCOUNTER — APPOINTMENT (OUTPATIENT)
Dept: RHEUMATOLOGY | Facility: CLINIC | Age: 49
End: 2019-08-19

## 2019-09-16 ENCOUNTER — APPOINTMENT (OUTPATIENT)
Dept: RHEUMATOLOGY | Facility: CLINIC | Age: 49
End: 2019-09-16
Payer: COMMERCIAL

## 2019-09-16 VITALS
TEMPERATURE: 98 F | DIASTOLIC BLOOD PRESSURE: 79 MMHG | HEIGHT: 68 IN | OXYGEN SATURATION: 98 % | HEART RATE: 64 BPM | WEIGHT: 233 LBS | BODY MASS INDEX: 35.31 KG/M2 | SYSTOLIC BLOOD PRESSURE: 115 MMHG

## 2019-09-16 PROCEDURE — 99214 OFFICE O/P EST MOD 30 MIN: CPT

## 2019-09-16 RX ORDER — IBUPROFEN 400 MG/1
400 TABLET, FILM COATED ORAL 3 TIMES DAILY
Qty: 60 | Refills: 3 | Status: DISCONTINUED | COMMUNITY
Start: 2019-05-02 | End: 2019-09-16

## 2019-09-16 RX ORDER — PREDNISONE 2.5 MG/1
2.5 TABLET ORAL
Qty: 30 | Refills: 1 | Status: DISCONTINUED | COMMUNITY
Start: 2018-11-13 | End: 2019-09-16

## 2019-09-19 ENCOUNTER — APPOINTMENT (OUTPATIENT)
Dept: ORTHOPEDIC SURGERY | Facility: CLINIC | Age: 49
End: 2019-09-19
Payer: COMMERCIAL

## 2019-09-19 VITALS — WEIGHT: 233 LBS | HEIGHT: 68 IN | BODY MASS INDEX: 35.31 KG/M2

## 2019-09-19 DIAGNOSIS — G56.01 CARPAL TUNNEL SYNDROME, RIGHT UPPER LIMB: ICD-10-CM

## 2019-09-19 PROCEDURE — 99214 OFFICE O/P EST MOD 30 MIN: CPT | Mod: 25

## 2019-09-19 PROCEDURE — 20526 THER INJECTION CARP TUNNEL: CPT | Mod: RT

## 2019-09-19 NOTE — DISCHARGE NOTE ADULT - ADMISSION DATE +STARTOFVISITDATE
Assumed care of Pt from triage
Dr. Ry Mccartney reviewed discharge instructions with the parent. The parent verbalized understanding. All questions and concerns were addressed. The patient is discharged ambulatory in the care of family members with instructions and prescriptions in hand. Pt is alert and oriented x 4. Respirations are clear and unlabored.
Statement Selected

## 2019-11-26 ENCOUNTER — RX RENEWAL (OUTPATIENT)
Age: 49
End: 2019-11-26

## 2019-12-16 ENCOUNTER — APPOINTMENT (OUTPATIENT)
Dept: RHEUMATOLOGY | Facility: CLINIC | Age: 49
End: 2019-12-16
Payer: COMMERCIAL

## 2019-12-16 VITALS
HEIGHT: 68 IN | BODY MASS INDEX: 35.61 KG/M2 | TEMPERATURE: 98.2 F | SYSTOLIC BLOOD PRESSURE: 135 MMHG | OXYGEN SATURATION: 99 % | WEIGHT: 235 LBS | HEART RATE: 61 BPM | DIASTOLIC BLOOD PRESSURE: 85 MMHG

## 2019-12-16 DIAGNOSIS — M65.9 SYNOVITIS AND TENOSYNOVITIS, UNSPECIFIED: ICD-10-CM

## 2019-12-16 PROCEDURE — 99214 OFFICE O/P EST MOD 30 MIN: CPT

## 2019-12-16 RX ORDER — BARICITINIB 2 MG/1
2 TABLET, FILM COATED ORAL
Qty: 30 | Refills: 3 | Status: DISCONTINUED | COMMUNITY
Start: 2019-05-01 | End: 2019-12-16

## 2020-01-03 ENCOUNTER — RX RENEWAL (OUTPATIENT)
Age: 50
End: 2020-01-03

## 2020-01-10 ENCOUNTER — TRANSCRIPTION ENCOUNTER (OUTPATIENT)
Age: 50
End: 2020-01-10

## 2020-01-10 ENCOUNTER — RX RENEWAL (OUTPATIENT)
Age: 50
End: 2020-01-10

## 2020-01-13 ENCOUNTER — RX RENEWAL (OUTPATIENT)
Age: 50
End: 2020-01-13

## 2020-01-22 ENCOUNTER — APPOINTMENT (OUTPATIENT)
Dept: CARDIOLOGY | Facility: CLINIC | Age: 50
End: 2020-01-22
Payer: COMMERCIAL

## 2020-01-22 ENCOUNTER — NON-APPOINTMENT (OUTPATIENT)
Age: 50
End: 2020-01-22

## 2020-01-22 VITALS
OXYGEN SATURATION: 99 % | HEIGHT: 68 IN | HEART RATE: 71 BPM | BODY MASS INDEX: 35.88 KG/M2 | DIASTOLIC BLOOD PRESSURE: 90 MMHG | SYSTOLIC BLOOD PRESSURE: 129 MMHG

## 2020-01-22 VITALS — BODY MASS INDEX: 35.88 KG/M2 | WEIGHT: 236 LBS

## 2020-01-22 PROCEDURE — 93000 ELECTROCARDIOGRAM COMPLETE: CPT

## 2020-01-22 PROCEDURE — 99214 OFFICE O/P EST MOD 30 MIN: CPT

## 2020-01-22 NOTE — PHYSICAL EXAM
[General Appearance - Well Developed] : well developed [Normal Appearance] : normal appearance [Well Groomed] : well groomed [General Appearance - Well Nourished] : well nourished [No Deformities] : no deformities [General Appearance - In No Acute Distress] : no acute distress [Normal Conjunctiva] : the conjunctiva exhibited no abnormalities [Eyelids - No Xanthelasma] : the eyelids demonstrated no xanthelasmas [Normal Oral Mucosa] : normal oral mucosa [No Oral Pallor] : no oral pallor [No Oral Cyanosis] : no oral cyanosis [Normal Jugular Venous A Waves Present] : normal jugular venous A waves present [Normal Jugular Venous V Waves Present] : normal jugular venous V waves present [No Jugular Venous Nielsen A Waves] : no jugular venous nielsen A waves [Respiration, Rhythm And Depth] : normal respiratory rhythm and effort [Exaggerated Use Of Accessory Muscles For Inspiration] : no accessory muscle use [Auscultation Breath Sounds / Voice Sounds] : lungs were clear to auscultation bilaterally [Heart Rate And Rhythm] : heart rate and rhythm were normal [Heart Sounds] : normal S1 and S2 [Murmurs] : no murmurs present [Abdomen Soft] : soft [Abdomen Tenderness] : non-tender [Abnormal Walk] : normal gait [Abdomen Mass (___ Cm)] : no abdominal mass palpated [Nail Clubbing] : no clubbing of the fingernails [Gait - Sufficient For Exercise Testing] : the gait was sufficient for exercise testing [Cyanosis, Localized] : no localized cyanosis [Petechial Hemorrhages (___cm)] : no petechial hemorrhages [Skin Color & Pigmentation] : normal skin color and pigmentation [] : no rash [No Venous Stasis] : no venous stasis [No Xanthoma] : no  xanthoma was observed [Skin Lesions] : no skin lesions [No Skin Ulcers] : no skin ulcer [Oriented To Time, Place, And Person] : oriented to person, place, and time [Affect] : the affect was normal [Mood] : the mood was normal [No Anxiety] : not feeling anxious

## 2020-01-22 NOTE — ASSESSMENT
[FreeTextEntry1] : 1. Atrial fibrillation. She wishes to proceed with her ablation, likely in April. She will return to see me prior to that.\par \par 2. Hypertension. Blood pressure is mildly elevated. We will monitor clinically for now but she may need adjustment in her medications.\par

## 2020-01-22 NOTE — REASON FOR VISIT
[Follow-Up - Clinic] : a clinic follow-up of [Atrial Fibrillation] : atrial fibrillation [FreeTextEntry1] : Since her last visit, she has been feeling well. She has had no chest pain or shortness of breath. She does note some odd symptoms and feels that her heart slows down, but is not able to describe them more fully. She has had no syncope.\par \par 1. Atrial fibrillation. She wishes to proceed with her ablation, likely in April. She will return to see me prior to that.\par \par 2. Hypertension. Blood pressure is mildly elevated. We will monitor clinically for now but she may need adjustment in her medications.\par

## 2020-03-06 ENCOUNTER — RX RENEWAL (OUTPATIENT)
Age: 50
End: 2020-03-06

## 2020-03-26 ENCOUNTER — TRANSCRIPTION ENCOUNTER (OUTPATIENT)
Age: 50
End: 2020-03-26

## 2020-04-25 ENCOUNTER — MESSAGE (OUTPATIENT)
Age: 50
End: 2020-04-25

## 2020-04-28 ENCOUNTER — RX RENEWAL (OUTPATIENT)
Age: 50
End: 2020-04-28

## 2020-07-01 ENCOUNTER — APPOINTMENT (OUTPATIENT)
Dept: CARDIOLOGY | Facility: CLINIC | Age: 50
End: 2020-07-01

## 2020-07-02 ENCOUNTER — APPOINTMENT (OUTPATIENT)
Dept: CARDIOLOGY | Facility: CLINIC | Age: 50
End: 2020-07-02
Payer: COMMERCIAL

## 2020-07-02 PROCEDURE — 99214 OFFICE O/P EST MOD 30 MIN: CPT | Mod: 95

## 2020-07-02 NOTE — HISTORY OF PRESENT ILLNESS
[Other Location: e.g. School (Enter Location, City,State)___] : at [unfilled], at the time of the visit. [Medical Office: ([facility])___] : at the medical office located in  [FreeTextEntry1] : \par \par Telehealth visit performed using HIPAA compliant [employer] platform.\par \par Since her last visit, the patient has been doing well.  She has had no significant palpitations.  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  Her blood pressure was only checked once since our last visit but was reasonably well-controlled.  Continue current medications.\par \par 2.  A. fib.  Hold Xarelto for 48 hours prior to colonoscopy and restart immediately afterwards unless there is a clinical contraindication.\par \par 3.  The patient has no active cardiac conditions and may safely proceed with her planned colonoscopy including the use of sedation or general anesthesia as indicated.  No further cardiac testing is required prior to her procedure.

## 2020-07-02 NOTE — PLAN
[FreeTextEntry1] : 1.  Her blood pressure was only checked once since our last visit but was reasonably well-controlled.  Continue current medications.\par \par 2.  A. fib.  Hold Xarelto for 48 hours prior to colonoscopy and restart immediately afterwards unless there is a clinical contraindication.  Will pursue RFA after colonoscopy.\par \par 3.  The patient has no active cardiac conditions and may safely proceed with her planned colonoscopy including the use of sedation or general anesthesia as indicated.  No further cardiac testing is required prior to her procedure.

## 2020-07-07 ENCOUNTER — TRANSCRIPTION ENCOUNTER (OUTPATIENT)
Age: 50
End: 2020-07-07

## 2020-07-14 ENCOUNTER — TRANSCRIPTION ENCOUNTER (OUTPATIENT)
Age: 50
End: 2020-07-14

## 2020-07-17 ENCOUNTER — TRANSCRIPTION ENCOUNTER (OUTPATIENT)
Age: 50
End: 2020-07-17

## 2020-07-21 ENCOUNTER — APPOINTMENT (OUTPATIENT)
Dept: RHEUMATOLOGY | Facility: CLINIC | Age: 50
End: 2020-07-21
Payer: COMMERCIAL

## 2020-07-21 DIAGNOSIS — Z11.59 ENCOUNTER FOR SCREENING FOR OTHER VIRAL DISEASES: ICD-10-CM

## 2020-07-21 PROCEDURE — 99214 OFFICE O/P EST MOD 30 MIN: CPT | Mod: 95

## 2020-07-21 RX ORDER — NAPROXEN 500 MG/1
500 TABLET ORAL
Qty: 60 | Refills: 0 | Status: DISCONTINUED | COMMUNITY
Start: 2019-07-16 | End: 2020-07-21

## 2020-07-22 NOTE — PHYSICAL EXAM
[General Appearance - Alert] : alert [General Appearance - In No Acute Distress] : in no acute distress [] : no respiratory distress [Respiration, Rhythm And Depth] : normal respiratory rhythm and effort [Impaired Insight] : insight and judgment were intact [Oriented To Time, Place, And Person] : oriented to person, place, and time

## 2020-07-22 NOTE — ASSESSMENT
[FreeTextEntry1] : Rheumatoid arthritis, \par incomplete response to MTX 20 mg , improved on Humira\par knee secondary OA; s/p TKR\par \par - labs \par - continue MTX 20 mg a week\par - continue Humira 40 mg sq q 2 weeks\par \par RTO 3 months.

## 2020-07-22 NOTE — HISTORY OF PRESENT ILLNESS
[Home] : at home, [unfilled] , at the time of the visit. [Medical Office: ([facility])___] : at the medical office located in  [Verbal consent obtained from patient] : the patient, [unfilled] [de-identified] : Last seen in December, 2020 [FreeTextEntry1] : Interval history\par ------------------\par =  on MTX 20 mg a week and Humira 40 mg q 2 weeks\par =  only min right wrist pain but the time 2 weeks are off\par =  no more swelling of right wrist, no more pain and swelling of  MCP joints\par

## 2020-08-06 ENCOUNTER — RX RENEWAL (OUTPATIENT)
Age: 50
End: 2020-08-06

## 2020-11-23 ENCOUNTER — TRANSCRIPTION ENCOUNTER (OUTPATIENT)
Age: 50
End: 2020-11-23

## 2020-11-24 ENCOUNTER — APPOINTMENT (OUTPATIENT)
Dept: RHEUMATOLOGY | Facility: CLINIC | Age: 50
End: 2020-11-24
Payer: COMMERCIAL

## 2020-11-24 DIAGNOSIS — M50.30 OTHER CERVICAL DISC DEGENERATION, UNSPECIFIED CERVICAL REGION: ICD-10-CM

## 2020-11-24 PROCEDURE — 99213 OFFICE O/P EST LOW 20 MIN: CPT | Mod: 95

## 2020-11-26 NOTE — HISTORY OF PRESENT ILLNESS
[Other Location: e.g. School (Enter Location, City,State)___] : at [unfilled], at the time of the visit. [Medical Office: ([facility])___] : at the medical office located in  [Spouse] : spouse [de-identified] : Last seen in July, 2020 [FreeTextEntry1] : Interval history\par ------------------\par =  on MTX 20 mg a week and Humira 40 mg q 2 weeks\par =  since on HUmira feels much better\par =  only am stiffness about 40 min,  no more swelling of right wrist, no more pain and swelling of  MCP joints\par  = back pain on and off\par

## 2020-11-26 NOTE — PHYSICAL EXAM
[General Appearance - Alert] : alert [General Appearance - In No Acute Distress] : in no acute distress [] : no respiratory distress [Oriented To Time, Place, And Person] : oriented to person, place, and time [Impaired Insight] : insight and judgment were intact

## 2020-11-26 NOTE — ASSESSMENT
[FreeTextEntry1] : \par Rheumatoid arthritis, \par incomplete response on monotx with MTX 20 mg , \par Humira q 2 weeks added\par knee secondary OA; s/p TKR\par DDD L spine\par \par - labs \par - continue MTX 20 mg a week\par - continue Humira 40 mg sq q 2 weeks\par - PT\par - letter for work as she is high risk for COVID 19 complications\par \par RTO 3 months.

## 2020-12-16 ENCOUNTER — TRANSCRIPTION ENCOUNTER (OUTPATIENT)
Age: 50
End: 2020-12-16

## 2020-12-22 ENCOUNTER — APPOINTMENT (OUTPATIENT)
Dept: COLORECTAL SURGERY | Facility: CLINIC | Age: 50
End: 2020-12-22
Payer: COMMERCIAL

## 2020-12-22 VITALS
WEIGHT: 248 LBS | TEMPERATURE: 97.9 F | HEIGHT: 68 IN | DIASTOLIC BLOOD PRESSURE: 96 MMHG | RESPIRATION RATE: 16 BRPM | BODY MASS INDEX: 37.59 KG/M2 | OXYGEN SATURATION: 100 % | SYSTOLIC BLOOD PRESSURE: 134 MMHG | HEART RATE: 72 BPM

## 2020-12-22 DIAGNOSIS — K62.5 HEMORRHAGE OF ANUS AND RECTUM: ICD-10-CM

## 2020-12-22 PROCEDURE — 99244 OFF/OP CNSLTJ NEW/EST MOD 40: CPT | Mod: 25

## 2020-12-22 PROCEDURE — 99072 ADDL SUPL MATRL&STAF TM PHE: CPT

## 2020-12-22 PROCEDURE — 46600 DIAGNOSTIC ANOSCOPY SPX: CPT

## 2020-12-22 NOTE — ASSESSMENT
[FreeTextEntry1] : Bleeding and prolapsing internal hemorrhoids\par -Bleeding is likely exacerbated by anticoagulation\par -We discussed treatment options at length. Given the active anticoagulation her options are conservative therapy with diet changes and creams versus formal hemorrhoidectomy or hemorrhoidal pexy.  Risks and benefits of these procedures were reviewed at length\par -I recommended initial conservative therapy\par -Patient will reattempt fiber supplementation with Metamucil one to 2 times per day\par -Will continue steroid cream with pramoxine\par -If no improvement, patient will followup in 4 weeks for reevaluation and to discuss additional options\par -All questions were answered

## 2020-12-22 NOTE — PHYSICAL EXAM
[Normal Breath Sounds] : Normal breath sounds [Normal Heart Sounds] : normal heart sounds [Normal Rate and Rhythm] : normal rate and rhythm [Alert] : alert [Oriented to Person] : oriented to person [Oriented to Place] : oriented to place [Oriented to Time] : oriented to time [Calm] : calm [de-identified] : soft, +BS [de-identified] : well nourished [de-identified] : NC/AT [de-identified] : +ROM/HOLDEN [de-identified] : intact

## 2020-12-22 NOTE — HISTORY OF PRESENT ILLNESS
[FreeTextEntry1] : Patient is 51 yo black female, presents for rectal bleeding. Patient had constipation and rectal bleeding earlier this year. Denies pain. Had colonoscopy in July 2020, found diverticulosis and hemorrhoids. She states the hemorrhoids prolapse with bowel movements. Tried a high fiber diet and increase in fluids. Uses hydrocortisone cream and sitz bath. BM daily to every other day. She is taking Xarelto for hx of a-fib, hx of ITP And history of pulmonary embolism after knee replacement.. Patient has had improved bowel movements with increased fluid intake. He denies fevers or chills. No family history of colon cancer. Patient believes her sister had inflammatory bowel disease but is not sure of the specifics. Hemorrhoids require manual reduction

## 2020-12-22 NOTE — CONSULT LETTER
[Dear  ___] : Dear  [unfilled], [Consult Letter:] : I had the pleasure of evaluating your patient, [unfilled]. [Please see my note below.] : Please see my note below. [Consult Closing:] : Thank you very much for allowing me to participate in the care of this patient.  If you have any questions, please do not hesitate to contact me. [Sincerely,] : Sincerely, [FreeTextEntry2] : Willie Maza [FreeTextEntry3] : Thierno Rayo MD FACS\par Chief Colon and Rectal Surgery\par [facility]

## 2020-12-22 NOTE — REVIEW OF SYSTEMS
[As Noted in HPI] : as noted in HPI [Negative] : Psychiatric [FreeTextEntry5] : a-fib [de-identified] : ITP

## 2021-01-12 ENCOUNTER — TRANSCRIPTION ENCOUNTER (OUTPATIENT)
Age: 51
End: 2021-01-12

## 2021-01-15 ENCOUNTER — APPOINTMENT (OUTPATIENT)
Dept: RHEUMATOLOGY | Facility: CLINIC | Age: 51
End: 2021-01-15
Payer: COMMERCIAL

## 2021-01-15 DIAGNOSIS — M25.531 PAIN IN RIGHT WRIST: ICD-10-CM

## 2021-01-15 PROCEDURE — 99443: CPT

## 2021-01-26 ENCOUNTER — APPOINTMENT (OUTPATIENT)
Dept: COLORECTAL SURGERY | Facility: CLINIC | Age: 51
End: 2021-01-26

## 2021-02-07 ENCOUNTER — RX RENEWAL (OUTPATIENT)
Age: 51
End: 2021-02-07

## 2021-02-08 ENCOUNTER — RX RENEWAL (OUTPATIENT)
Age: 51
End: 2021-02-08

## 2021-03-26 ENCOUNTER — TRANSCRIPTION ENCOUNTER (OUTPATIENT)
Age: 51
End: 2021-03-26

## 2021-03-29 ENCOUNTER — TRANSCRIPTION ENCOUNTER (OUTPATIENT)
Age: 51
End: 2021-03-29

## 2021-03-30 ENCOUNTER — APPOINTMENT (OUTPATIENT)
Dept: RHEUMATOLOGY | Facility: CLINIC | Age: 51
End: 2021-03-30
Payer: COMMERCIAL

## 2021-03-30 ENCOUNTER — TRANSCRIPTION ENCOUNTER (OUTPATIENT)
Age: 51
End: 2021-03-30

## 2021-03-30 PROCEDURE — 99442: CPT

## 2021-04-08 ENCOUNTER — TRANSCRIPTION ENCOUNTER (OUTPATIENT)
Age: 51
End: 2021-04-08

## 2021-04-09 ENCOUNTER — TRANSCRIPTION ENCOUNTER (OUTPATIENT)
Age: 51
End: 2021-04-09

## 2021-04-09 ENCOUNTER — RX RENEWAL (OUTPATIENT)
Age: 51
End: 2021-04-09

## 2021-04-12 ENCOUNTER — OUTPATIENT (OUTPATIENT)
Dept: OUTPATIENT SERVICES | Facility: HOSPITAL | Age: 51
LOS: 1 days | End: 2021-04-12
Payer: COMMERCIAL

## 2021-04-12 ENCOUNTER — APPOINTMENT (OUTPATIENT)
Dept: RHEUMATOLOGY | Facility: CLINIC | Age: 51
End: 2021-04-12
Payer: COMMERCIAL

## 2021-04-12 ENCOUNTER — APPOINTMENT (OUTPATIENT)
Dept: CT IMAGING | Facility: IMAGING CENTER | Age: 51
End: 2021-04-12
Payer: COMMERCIAL

## 2021-04-12 DIAGNOSIS — Z79.52 LONG TERM (CURRENT) USE OF SYSTEMIC STEROIDS: ICD-10-CM

## 2021-04-12 DIAGNOSIS — R06.02 SHORTNESS OF BREATH: ICD-10-CM

## 2021-04-12 DIAGNOSIS — Z98.51 TUBAL LIGATION STATUS: Chronic | ICD-10-CM

## 2021-04-12 PROCEDURE — 71250 CT THORAX DX C-: CPT

## 2021-04-12 PROCEDURE — 71250 CT THORAX DX C-: CPT | Mod: 26

## 2021-04-12 PROCEDURE — 99214 OFFICE O/P EST MOD 30 MIN: CPT | Mod: 95

## 2021-04-13 ENCOUNTER — TRANSCRIPTION ENCOUNTER (OUTPATIENT)
Age: 51
End: 2021-04-13

## 2021-04-13 ENCOUNTER — APPOINTMENT (OUTPATIENT)
Dept: PULMONOLOGY | Facility: CLINIC | Age: 51
End: 2021-04-13
Payer: COMMERCIAL

## 2021-04-13 PROBLEM — Z79.52 IMMUNOCOMPROMISED DUE TO CORTICOSTEROIDS: Status: ACTIVE | Noted: 2021-04-12

## 2021-04-13 PROCEDURE — 99214 OFFICE O/P EST MOD 30 MIN: CPT | Mod: 95

## 2021-04-13 NOTE — HISTORY OF PRESENT ILLNESS
[Home] : at home, [unfilled] , at the time of the visit. [Medical Office: ([facility])___] : at the medical office located in  [Verbal consent obtained from patient] : the patient, [unfilled] [de-identified] : Last seen in July, 2020 [FreeTextEntry1] : Interval history\par ------------------\par =  had COVID was dx on 3/21 , continued persistent cough and chest tightness, O2 sat was 98 \par CXR was clear\par =  still has persistent cough even with Benzonate , but takes  mali 3 tabs a day\par =  chest tightness persists especially with exercise activity , had to use pump, no fever x 2 weeks, still loss of taste and smell\par =  off MTX   and Humira since 3/20 \par \par

## 2021-04-13 NOTE — HISTORY OF PRESENT ILLNESS
[Home] : at home, [unfilled] , at the time of the visit. [Medical Office: ([facility])___] : at the medical office located in  [Verbal consent obtained from patient] : the patient, [unfilled] [TextBox_4] : Telehealth visit, Covid infection, cough and shortness of breath.\par \par Patient known to me, 51-year-old woman with a history of rheumatoid arthritis and a pulmonary embolism in 2016 following a knee replacement, treated with anticoagulation for 6 months, normal PFT and normalized echo.  \par \par Patient is an RN, used to work at [employer]\par \par Last seen in 2016.  But continues to be followed by rheumatology actively.  On Humira and methotrexate\par \par Patient tested positive for Covid on March 21.  Initial symptoms included fevers, chills, loss of taste and smell, cough and chest tightness.  She was evaluated in ED and emergency room.  Negative chest x-ray, and normal oxygen levels.  Urgent care she said she was given 1 shot of IM steroids, and given albuterol which she has been using as needed.\par She had been referred for antibody infusion, but initially refused and then when she decided she wanted she was out of the window.\par Patient had not yet received the Covid vaccine, she had actually been scheduled for March 31\par \par Her fevers and chills have resolved.  When she has this persistent coughing, with some episodes of central chest tightness.  No shortness of breath at rest, but does feel a bit winded with walking.  She does get some relief when she uses the albuterol\par \par Cough is triggered by any exertion, talking, laughing.  Actually is not bad at night\par \par On exam, the patient is coughing frequently, but no distress and speaking clearly.\par \par She had a CT of the chest yesterday which looks okay\par sHe is scheduled for labs today, including a D-dimer.\par We will give her a 5-day course of prednisone 20 mg along with starting Advair, and nasal Flonase and Astelin.\par Follow-up scheduled for Thursday

## 2021-04-13 NOTE — ASSESSMENT
[FreeTextEntry1] : COVID19 ,poor recovery with persistent coughing, with   chest tightness. Cough is triggered by any exertion, talking, laughing. Actually is not bad at night\par  Rheumatoid arthritis symptoms worsening as continues to be off MTX and off HUmira\par \par - CT of the chest \par - labs for home blood draw\par - would likely benefit from a short course of prednisone / ? antiviral tx\par - will refer to [facility] program pulmonary  \par - continue to hold Humira and MTX for now\par \par fu after CT chest\par

## 2021-04-15 ENCOUNTER — APPOINTMENT (OUTPATIENT)
Dept: PULMONOLOGY | Facility: CLINIC | Age: 51
End: 2021-04-15
Payer: COMMERCIAL

## 2021-04-15 PROCEDURE — 99213 OFFICE O/P EST LOW 20 MIN: CPT | Mod: 95

## 2021-04-16 ENCOUNTER — APPOINTMENT (OUTPATIENT)
Dept: PULMONOLOGY | Facility: CLINIC | Age: 51
End: 2021-04-16
Payer: COMMERCIAL

## 2021-04-16 ENCOUNTER — NON-APPOINTMENT (OUTPATIENT)
Age: 51
End: 2021-04-16

## 2021-04-16 LAB
ALBUMIN SERPL ELPH-MCNC: 4.3 G/DL
ALP BLD-CCNC: 77 U/L
ALT SERPL-CCNC: 25 U/L
ANION GAP SERPL CALC-SCNC: 12 MMOL/L
AST SERPL-CCNC: 19 U/L
BASOPHILS # BLD AUTO: 0.03 K/UL
BASOPHILS NFR BLD AUTO: 0.4 %
BILIRUB SERPL-MCNC: 0.6 MG/DL
BUN SERPL-MCNC: 20 MG/DL
CALCIUM SERPL-MCNC: 9.8 MG/DL
CHLORIDE SERPL-SCNC: 106 MMOL/L
CO2 SERPL-SCNC: 24 MMOL/L
COVID-19 NUCLEOCAPSID  GAM ANTIBODY INTERPRETATION: POSITIVE
COVID-19 SPIKE DOMAIN ANTIBODY INTERPRETATION: POSITIVE
CREAT SERPL-MCNC: 0.91 MG/DL
CRP SERPL-MCNC: <3 MG/L
DEPRECATED D DIMER PPP IA-ACNC: <150 NG/ML DDU
EOSINOPHIL # BLD AUTO: 0.08 K/UL
EOSINOPHIL NFR BLD AUTO: 1.1 %
ERYTHROCYTE [SEDIMENTATION RATE] IN BLOOD BY WESTERGREN METHOD: 19 MM/HR
FERRITIN SERPL-MCNC: 54 NG/ML
GLUCOSE SERPL-MCNC: 58 MG/DL
HCT VFR BLD CALC: 45.1 %
HGB BLD-MCNC: 14.5 G/DL
IMM GRANULOCYTES NFR BLD AUTO: 0.1 %
LYMPHOCYTES # BLD AUTO: 3.22 K/UL
LYMPHOCYTES NFR BLD AUTO: 44.5 %
MAN DIFF?: NORMAL
MCHC RBC-ENTMCNC: 30.6 PG
MCHC RBC-ENTMCNC: 32.2 GM/DL
MCV RBC AUTO: 95.1 FL
MONOCYTES # BLD AUTO: 0.55 K/UL
MONOCYTES NFR BLD AUTO: 7.6 %
NEUTROPHILS # BLD AUTO: 3.34 K/UL
NEUTROPHILS NFR BLD AUTO: 46.3 %
PLATELET # BLD AUTO: 141 K/UL
POTASSIUM SERPL-SCNC: 5 MMOL/L
PROT SERPL-MCNC: 6.9 G/DL
RBC # BLD: 4.74 M/UL
RBC # FLD: 14.4 %
SARS-COV-2 AB SERPL IA-ACNC: 12.7 U/ML
SARS-COV-2 AB SERPL QL IA: 21 INDEX
SODIUM SERPL-SCNC: 142 MMOL/L
WBC # FLD AUTO: 7.23 K/UL

## 2021-04-16 PROCEDURE — 99213 OFFICE O/P EST LOW 20 MIN: CPT | Mod: 95

## 2021-04-16 NOTE — HISTORY OF PRESENT ILLNESS
[Home] : at home, [unfilled] , at the time of the visit. [Medical Office: ([facility])___] : at the medical office located in  [Verbal consent obtained from patient] : the patient, [unfilled] [FreeTextEntry1] : 04/15/21\par 51 year old female presents for follow-up telehealth visit for covid infection.\par \par History of rheumatoid arthritis and a pulmonary embolism in 2016 following a knee replacement, treated with anticoagulation for 6 months, normal PFT and normalized echo.  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 She has not used the albuterol since starting the maintenance Advair. Cough persists triggering by talking, laughing, eating. She reports that she is not SOB but does get winded with minimal exertion such as walking. She is still dealing with a decreased appetite but did managed to have soup and carrot/ginger juice yesterday. Patient was seen by her PCP yesterday who told her the results of her CT scan Covid -pneumonia. I discussed with the patient that this is an expected finding in an acute or recovery covid positive patient and that it would not change treatment as it is a viral pneumonia. Patient verbalized her understanding. Patient reports that labs will be drawn today in-home.\par \par Patient also expressed concern about being off of her RA treatment. She also feels that she may need the 40 MG of prednisone as Dr. Lisker originally recommended. Will discuss with Dr. Lisker and Dr. Glaser (rheumatologist)\par \par Vital Signs\par SpO2 96 at rest and ambulating\par HR 79\par At PCP yesterday (/80)\par \par Plan\par Continue Advair, flonase/azelastine and albuterol as needed.\par Patient will continue on prednisone. Taper to 10 mg next week and 5 mg the following.\par Patient will be reassessed in two weeks to determine if she can restart Humira/methotrexate\par F/u lab results with patient tomorrow.

## 2021-04-16 NOTE — PLAN
[FreeTextEntry1] : 51 year old female presents for follow-up telehealth for covid infection.\par Labs to be completed in-home\par Further management pending lab results and consultation with specialist providers.\par

## 2021-04-16 NOTE — HISTORY OF PRESENT ILLNESS
[Home] : at home, [unfilled] , at the time of the visit. [Medical Office: ([facility])___] : at the medical office located in  [Verbal consent obtained from patient] : the patient, [unfilled] [FreeTextEntry1] : 51 year old female presents for telephonic follow-up visit for covid-19\par Discussed plan with patient to continue low dose prednisone.\par Provided results of most recent labwork.\par Patient will be re-evaluated in 2 weeks to determine if she can restart RA therapy (Humira/methotrexate)\par \par \par Continue albuterol as needed, Advair twice daily and flonase/azelastine.\par \par Recent labs 04/15/21: D-dimer: <150, COVID-19 Nucleocapsid Positive: 21.00, Bradley Antibody Positive: 12.70, ESR:19, CRP: <3, Ferritin: 54\par \par Clarified with patient that she is no longer contagious at almost 4 weeks after initial onset of symptoms. Patient verbalizes her understanding.

## 2021-04-30 ENCOUNTER — APPOINTMENT (OUTPATIENT)
Dept: RHEUMATOLOGY | Facility: CLINIC | Age: 51
End: 2021-04-30
Payer: COMMERCIAL

## 2021-04-30 ENCOUNTER — APPOINTMENT (OUTPATIENT)
Dept: PULMONOLOGY | Facility: CLINIC | Age: 51
End: 2021-04-30
Payer: COMMERCIAL

## 2021-04-30 VITALS
HEIGHT: 68 IN | RESPIRATION RATE: 16 BRPM | TEMPERATURE: 97.3 F | SYSTOLIC BLOOD PRESSURE: 134 MMHG | DIASTOLIC BLOOD PRESSURE: 79 MMHG | OXYGEN SATURATION: 97 % | BODY MASS INDEX: 37.74 KG/M2 | WEIGHT: 249 LBS | HEART RATE: 69 BPM

## 2021-04-30 PROCEDURE — 99214 OFFICE O/P EST MOD 30 MIN: CPT

## 2021-04-30 PROCEDURE — 99213 OFFICE O/P EST LOW 20 MIN: CPT | Mod: 95

## 2021-04-30 PROCEDURE — 99072 ADDL SUPL MATRL&STAF TM PHE: CPT

## 2021-04-30 NOTE — HISTORY OF PRESENT ILLNESS
[Home] : at home, [unfilled] , at the time of the visit. [Medical Office: ([facility])___] : at the medical office located in  [Verbal consent obtained from patient] : the patient, [unfilled] [FreeTextEntry1] : 51 year old female presents for follow-up evaluation for [facility] program. No acute covid illness.\par \par Patient reports that she's feeling much better overall. She has only used the albuterol inhaler twice since our last meeting. She is taking advair taking twice a day, nasal spray and low dose prednisone. She reports that her voice gets hoarse and she coughs when she speaks for an extended period of time. She also gets a tightness in her chest when she overexerts herself. She is able to complete all ADLs/ IADLs if she paces herself. She returned to work on Monday 04/26/21. 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 Wednesday she went back to regular duties on the floor and found that she has difficult wearing a mask and performing physical activity. \par \par She usually checks her SatO2 once a day, it is sustained 96%<. She has noticed an increase in her HR, 100-106 when she comes home from work. She notes that it can take several minutes at rest for her HR to return to the 80s. She reports palpitations. She has a PMHx of Afib and is currently treated with Xarelto 20MG and Methotrexate Sodium 2.5 mg. She states that she will schedule an appointment with her cardiologist for evaluation.\par \par She has an appointment with her rheumatologist in-person at 11:45am. She will be restarted on her RA therapy (Humira/Methotrexate). She reports pain in her right wrist and knees but also states that the prednisone has prevented full relaps.\par \par PLAN\par -Follow-up in two week TEB to determine readiness for in-person evaluation.\par -Restart Humira and Methotrexate, d/c prednisone per rheumatology\par -Continue advair twice daily. Encouraged patient to use albuterol more frequently for symptoms and use prior to strenuous activity (extended period of talking, significant physical activity) to prevent symptoms\par -F/u with cardiologist for evaluation and determine appropriate therapy.\par \par attending:\par agree with above

## 2021-05-01 LAB
ALBUMIN SERPL ELPH-MCNC: 4.1 G/DL
ALP BLD-CCNC: 62 U/L
ALT SERPL-CCNC: 14 U/L
ANION GAP SERPL CALC-SCNC: 11 MMOL/L
AST SERPL-CCNC: 18 U/L
BASOPHILS # BLD AUTO: 0.04 K/UL
BASOPHILS NFR BLD AUTO: 0.6 %
BILIRUB SERPL-MCNC: 0.5 MG/DL
BUN SERPL-MCNC: 19 MG/DL
CALCIUM SERPL-MCNC: 9.9 MG/DL
CHLORIDE SERPL-SCNC: 105 MMOL/L
CK SERPL-CCNC: 85 U/L
CO2 SERPL-SCNC: 25 MMOL/L
CREAT SERPL-MCNC: 0.92 MG/DL
CRP SERPL-MCNC: <3 MG/L
EOSINOPHIL # BLD AUTO: 0.15 K/UL
EOSINOPHIL NFR BLD AUTO: 2.4 %
ERYTHROCYTE [SEDIMENTATION RATE] IN BLOOD BY WESTERGREN METHOD: 15 MM/HR
FERRITIN SERPL-MCNC: 45 NG/ML
GLUCOSE SERPL-MCNC: 57 MG/DL
HBV CORE IGG+IGM SER QL: NONREACTIVE
HBV CORE IGM SER QL: NONREACTIVE
HBV SURFACE AB SER QL: REACTIVE
HBV SURFACE AG SER QL: NONREACTIVE
HCT VFR BLD CALC: 46 %
HCV AB SER QL: NONREACTIVE
HCV S/CO RATIO: 0.21 S/CO
HGB BLD-MCNC: 14.3 G/DL
IMM GRANULOCYTES NFR BLD AUTO: 0.2 %
LYMPHOCYTES # BLD AUTO: 2.09 K/UL
LYMPHOCYTES NFR BLD AUTO: 33.2 %
MAN DIFF?: NORMAL
MCHC RBC-ENTMCNC: 30 PG
MCHC RBC-ENTMCNC: 31.1 GM/DL
MCV RBC AUTO: 96.6 FL
MONOCYTES # BLD AUTO: 0.59 K/UL
MONOCYTES NFR BLD AUTO: 9.4 %
NEUTROPHILS # BLD AUTO: 3.42 K/UL
NEUTROPHILS NFR BLD AUTO: 54.2 %
PLATELET # BLD AUTO: NORMAL K/UL
POTASSIUM SERPL-SCNC: 4.4 MMOL/L
PROT SERPL-MCNC: 6.6 G/DL
RBC # BLD: 4.76 M/UL
RBC # FLD: 14.4 %
SODIUM SERPL-SCNC: 142 MMOL/L
WBC # FLD AUTO: 6.3 K/UL

## 2021-05-03 LAB
M TB IFN-G BLD-IMP: NEGATIVE
QUANTIFERON TB PLUS MITOGEN MINUS NIL: 8.66 IU/ML
QUANTIFERON TB PLUS NIL: 0.06 IU/ML
QUANTIFERON TB PLUS TB1 MINUS NIL: 0 IU/ML
QUANTIFERON TB PLUS TB2 MINUS NIL: 0.01 IU/ML

## 2021-05-19 ENCOUNTER — RX RENEWAL (OUTPATIENT)
Age: 51
End: 2021-05-19

## 2021-05-19 ENCOUNTER — APPOINTMENT (OUTPATIENT)
Dept: PULMONOLOGY | Facility: CLINIC | Age: 51
End: 2021-05-19
Payer: COMMERCIAL

## 2021-05-19 PROCEDURE — 99213 OFFICE O/P EST LOW 20 MIN: CPT | Mod: 95

## 2021-05-19 NOTE — HISTORY OF PRESENT ILLNESS
[Home] : at home, [unfilled] , at the time of the visit. [Medical Office: ([facility])___] : at the medical office located in  [Verbal consent obtained from patient] : the patient, [unfilled] [TextBox_4] : f/u telehealth\par post covid, underlying RA\par \par off prednisone. back on mtx, and received humira\par \par feeling well overall. lingering cough - throat clearing\par \par using advair\par  \par still some fatigue, wick\par \par on exam, appears well, no distress\par \par continue advair\par start nasal astelin\par \par schedule in person visit/PFT\par \par plan for vaccine around July

## 2021-06-03 ENCOUNTER — APPOINTMENT (OUTPATIENT)
Dept: ORTHOPEDIC SURGERY | Facility: CLINIC | Age: 51
End: 2021-06-03
Payer: COMMERCIAL

## 2021-06-03 VITALS — HEIGHT: 68 IN | BODY MASS INDEX: 37.89 KG/M2 | WEIGHT: 250 LBS

## 2021-06-03 DIAGNOSIS — S80.01XA CONTUSION OF RIGHT KNEE, INITIAL ENCOUNTER: ICD-10-CM

## 2021-06-03 PROCEDURE — 73562 X-RAY EXAM OF KNEE 3: CPT | Mod: RT

## 2021-06-03 PROCEDURE — 99214 OFFICE O/P EST MOD 30 MIN: CPT

## 2021-06-07 ENCOUNTER — TRANSCRIPTION ENCOUNTER (OUTPATIENT)
Age: 51
End: 2021-06-07

## 2021-06-21 ENCOUNTER — TRANSCRIPTION ENCOUNTER (OUTPATIENT)
Age: 51
End: 2021-06-21

## 2021-06-22 ENCOUNTER — TRANSCRIPTION ENCOUNTER (OUTPATIENT)
Age: 51
End: 2021-06-22

## 2021-07-07 ENCOUNTER — TRANSCRIPTION ENCOUNTER (OUTPATIENT)
Age: 51
End: 2021-07-07

## 2021-07-07 ENCOUNTER — NON-APPOINTMENT (OUTPATIENT)
Age: 51
End: 2021-07-07

## 2021-07-07 ENCOUNTER — APPOINTMENT (OUTPATIENT)
Dept: CARDIOLOGY | Facility: CLINIC | Age: 51
End: 2021-07-07
Payer: COMMERCIAL

## 2021-07-07 VITALS
HEIGHT: 68 IN | SYSTOLIC BLOOD PRESSURE: 132 MMHG | DIASTOLIC BLOOD PRESSURE: 88 MMHG | OXYGEN SATURATION: 99 % | BODY MASS INDEX: 37.44 KG/M2 | HEART RATE: 60 BPM | WEIGHT: 247 LBS

## 2021-07-07 PROCEDURE — 93000 ELECTROCARDIOGRAM COMPLETE: CPT

## 2021-07-07 PROCEDURE — 99214 OFFICE O/P EST MOD 30 MIN: CPT

## 2021-07-07 NOTE — ASSESSMENT
[FreeTextEntry1] : 1. Check echo to evaluate LA size.\par 2. Will think about RFA/PVI, but for now will monitor clinically. Cont Xarelto.

## 2021-07-07 NOTE — REASON FOR VISIT
[Arrhythmia/ECG Abnorrmalities] : arrhythmia/ECG abnormalities [FreeTextEntry1] : Had COVID in March 2021. Otherwise doing well, but having palpitations 1-2x/week, which is an increase in frequency and intensity. Not yet ready for RFA.\par \par 1. Check echo to evaluate LA size.\par 2. Will think about RFA/PVI, but for now will monitor clinically. Cont Xarelto.

## 2021-07-09 ENCOUNTER — RX RENEWAL (OUTPATIENT)
Age: 51
End: 2021-07-09

## 2021-07-21 ENCOUNTER — APPOINTMENT (OUTPATIENT)
Dept: PULMONOLOGY | Facility: CLINIC | Age: 51
End: 2021-07-21
Payer: COMMERCIAL

## 2021-07-21 VITALS
HEART RATE: 60 BPM | OXYGEN SATURATION: 97 % | BODY MASS INDEX: 40.34 KG/M2 | TEMPERATURE: 97.8 F | HEIGHT: 67 IN | WEIGHT: 257 LBS

## 2021-07-21 VITALS
SYSTOLIC BLOOD PRESSURE: 125 MMHG | DIASTOLIC BLOOD PRESSURE: 90 MMHG | OXYGEN SATURATION: 97 % | RESPIRATION RATE: 16 BRPM | TEMPERATURE: 97.5 F | HEART RATE: 64 BPM

## 2021-07-21 DIAGNOSIS — R06.83 SNORING: ICD-10-CM

## 2021-07-21 PROCEDURE — ZZZZZ: CPT

## 2021-07-21 PROCEDURE — 99213 OFFICE O/P EST LOW 20 MIN: CPT | Mod: 25

## 2021-07-21 PROCEDURE — 94729 DIFFUSING CAPACITY: CPT

## 2021-07-21 PROCEDURE — 94060 EVALUATION OF WHEEZING: CPT

## 2021-07-21 PROCEDURE — 94726 PLETHYSMOGRAPHY LUNG VOLUMES: CPT

## 2021-07-21 NOTE — PHYSICAL EXAM
[No Acute Distress] : no acute distress [Well Groomed] : well groomed [Well Developed] : well developed [Normal Oropharynx] : normal oropharynx [Normal Appearance] : normal appearance [No Neck Mass] : no neck mass [Normal Rate/Rhythm] : normal rate/rhythm [Normal S1, S2] : normal s1, s2 [No Resp Distress] : no resp distress [Clear to Auscultation Bilaterally] : clear to auscultation bilaterally [Benign] : benign [Normal Gait] : normal gait [No Clubbing] : no clubbing [No Cyanosis] : no cyanosis [No Edema] : no edema [Normal Color/ Pigmentation] : normal color/ pigmentation [No Focal Deficits] : no focal deficits [Oriented x3] : oriented x3 [Normal Affect] : normal affect [TextBox_80] : indentations under bra line

## 2021-07-21 NOTE — ASSESSMENT
[FreeTextEntry1] :  is a 50yo F with a hx of RA, PE, AFib, and prior COVID infection presenting for pulmonary follow up and found to have recovered well. She can carry out most of her daily activities without shortness of breath, her cough has resolved, and she is no longer requiring pulmonary medications. \par \par Prior COVID: PFTs normal\par - no longer requiring Albuterol, Advair, Astelin\par - first dose Pfizer COVID vaccine 7/12/21\par \par Difficulty breathing when sleeping on right side\par - home sleep study (also for pulmonary clearance for potential future breast reduction surgery)\par \par Intermittent chest pain\par - f/u per cardiology: echocardiogram\par \par RA\par - continue methotrexate and Humira as per rheumatology\par \par AFib\par - continue Xarelto as per cardiology, f/u cardiology\par \par Yamile Godinez MS3\par \par

## 2021-07-21 NOTE — HISTORY OF PRESENT ILLNESS
[TextBox_4] : Patient is a 50 yo F w/ RA (Humira and MTX), OA , pAF (Xarelto), history of PE and prior COVID who presents today for pulmonary followup. Patient was last seen in clinic on 5/19/2021 during which she was continued on Advair 250/50 and started on nasal  Azelastine. \par Patient is doing well and feels significantly better since her last visit. She has not needed Advair, albuterol, or Astelin for the last 2 months, and her cough has resolved. She is able to walk, grocery shop, work at a medical clinic, and dance without difficulty breathing. She mentions that house chores or activities that require bending over or walking up more than 6 stairs cause shortness of breath, but she is always able to recover with rest. She notes that she cannot sleep for more than 2 hours on her right side or back without feeling short of breath, but finds relief laying on her left side. She also notes some intermittent upper left chest pain and some thickening of the skin on her calves, has seen her cardiologist recently, and is due to follow up with an echo. She has not had any dizziness or syncope and denies leg pain with activity. She has resumed methotrexate and Humira with mostly good management of her RA, though her right wrist is persistently sore. She received first dose of Pfizer COVID vaccine on 7/12/21 after holding the methotrexate and Humira for 1 week and will do the same when due for second dose 8/3/21. She traveled to [place] 7 weeks ago and is due to travel to [place] in several weeks. She mentions her desire to lose weight and potentially undergo breast reduction surgery. Her only concern is that her lungs are ok. \par \par Last CT Chest 4/2021 showed bilateral peripheral reticular opacities and GGO.\par Last PFT 9/2016 normal. PFTs today are normal. \par

## 2021-07-21 NOTE — REVIEW OF SYSTEMS
[Recent Wt Gain (___ Lbs)] : ~T recent [unfilled] lb weight gain [SOB on Exertion] : sob on exertion [Chest Discomfort] : chest discomfort [Palpitations] : palpitations [Constipation] : constipation [Chronic Pain] : chronic pain [Negative] : Neurologic [Cough] : no cough [Dyspnea] : no dyspnea [Claudication] : no claudication [Edema] : no edema [Syncope] : no syncope [Seasonal Allergies] : no seasonal allergies [Dizziness] : no dizziness

## 2021-08-27 ENCOUNTER — TRANSCRIPTION ENCOUNTER (OUTPATIENT)
Age: 51
End: 2021-08-27

## 2021-08-30 ENCOUNTER — TRANSCRIPTION ENCOUNTER (OUTPATIENT)
Age: 51
End: 2021-08-30

## 2021-09-07 ENCOUNTER — TRANSCRIPTION ENCOUNTER (OUTPATIENT)
Age: 51
End: 2021-09-07

## 2021-09-10 ENCOUNTER — APPOINTMENT (OUTPATIENT)
Dept: ORTHOPEDIC SURGERY | Facility: CLINIC | Age: 51
End: 2021-09-10

## 2021-09-29 ENCOUNTER — APPOINTMENT (OUTPATIENT)
Dept: CV DIAGNOSITCS | Facility: HOSPITAL | Age: 51
End: 2021-09-29

## 2021-09-29 ENCOUNTER — OUTPATIENT (OUTPATIENT)
Dept: OUTPATIENT SERVICES | Facility: HOSPITAL | Age: 51
LOS: 1 days | End: 2021-09-29
Payer: COMMERCIAL

## 2021-09-29 DIAGNOSIS — Z98.51 TUBAL LIGATION STATUS: Chronic | ICD-10-CM

## 2021-09-29 DIAGNOSIS — I48.0 PAROXYSMAL ATRIAL FIBRILLATION: ICD-10-CM

## 2021-09-29 PROCEDURE — 93306 TTE W/DOPPLER COMPLETE: CPT | Mod: 26

## 2021-10-11 ENCOUNTER — APPOINTMENT (OUTPATIENT)
Dept: ORTHOPEDIC SURGERY | Facility: CLINIC | Age: 51
End: 2021-10-11
Payer: COMMERCIAL

## 2021-10-11 VITALS
DIASTOLIC BLOOD PRESSURE: 88 MMHG | BODY MASS INDEX: 36.07 KG/M2 | SYSTOLIC BLOOD PRESSURE: 137 MMHG | HEIGHT: 68 IN | HEART RATE: 60 BPM | WEIGHT: 238 LBS

## 2021-10-11 DIAGNOSIS — M54.2 CERVICALGIA: ICD-10-CM

## 2021-10-11 DIAGNOSIS — M47.812 SPONDYLOSIS W/OUT MYELOPATHY OR RADICULOPATHY, CERVICAL REGION: ICD-10-CM

## 2021-10-11 PROCEDURE — 99214 OFFICE O/P EST MOD 30 MIN: CPT

## 2021-10-11 PROCEDURE — 72040 X-RAY EXAM NECK SPINE 2-3 VW: CPT

## 2021-11-15 ENCOUNTER — RX RENEWAL (OUTPATIENT)
Age: 51
End: 2021-11-15

## 2021-12-02 ENCOUNTER — NON-APPOINTMENT (OUTPATIENT)
Age: 51
End: 2021-12-02

## 2021-12-02 ENCOUNTER — TRANSCRIPTION ENCOUNTER (OUTPATIENT)
Age: 51
End: 2021-12-02

## 2021-12-14 ENCOUNTER — APPOINTMENT (OUTPATIENT)
Dept: RHEUMATOLOGY | Facility: CLINIC | Age: 51
End: 2021-12-14
Payer: COMMERCIAL

## 2021-12-14 VITALS
OXYGEN SATURATION: 96 % | HEIGHT: 68 IN | SYSTOLIC BLOOD PRESSURE: 132 MMHG | WEIGHT: 245 LBS | HEART RATE: 64 BPM | DIASTOLIC BLOOD PRESSURE: 82 MMHG | TEMPERATURE: 97.6 F | BODY MASS INDEX: 37.13 KG/M2

## 2021-12-14 DIAGNOSIS — M25.569 PAIN IN UNSPECIFIED KNEE: ICD-10-CM

## 2021-12-14 DIAGNOSIS — Z23 ENCOUNTER FOR IMMUNIZATION: ICD-10-CM

## 2021-12-14 PROCEDURE — 99214 OFFICE O/P EST MOD 30 MIN: CPT

## 2021-12-15 RX ORDER — PREDNISONE 10 MG/1
10 TABLET ORAL DAILY
Qty: 7 | Refills: 0 | Status: COMPLETED | COMMUNITY
Start: 2021-04-16 | End: 2021-12-15

## 2021-12-15 RX ORDER — PREDNISONE 20 MG/1
20 TABLET ORAL DAILY
Qty: 5 | Refills: 0 | Status: COMPLETED | COMMUNITY
Start: 2021-04-13 | End: 2021-12-15

## 2021-12-15 RX ORDER — PREDNISONE 5 MG/1
5 TABLET ORAL DAILY
Qty: 7 | Refills: 0 | Status: COMPLETED | COMMUNITY
Start: 2021-04-16 | End: 2021-12-15

## 2021-12-17 LAB
ALBUMIN SERPL ELPH-MCNC: 4 G/DL
ALP BLD-CCNC: 74 U/L
ALT SERPL-CCNC: 18 U/L
ANION GAP SERPL CALC-SCNC: 12 MMOL/L
AST SERPL-CCNC: 16 U/L
BASOPHILS # BLD AUTO: 0.04 K/UL
BASOPHILS NFR BLD AUTO: 0.9 %
BILIRUB SERPL-MCNC: 0.5 MG/DL
BUN SERPL-MCNC: 23 MG/DL
CALCIUM SERPL-MCNC: 9.6 MG/DL
CHLORIDE SERPL-SCNC: 107 MMOL/L
CO2 SERPL-SCNC: 23 MMOL/L
COVID-19 NUCLEOCAPSID  GAM ANTIBODY INTERPRETATION: POSITIVE
COVID-19 SPIKE DOMAIN ANTIBODY INTERPRETATION: POSITIVE
CREAT SERPL-MCNC: 0.88 MG/DL
CRP SERPL-MCNC: <3 MG/L
EOSINOPHIL # BLD AUTO: 0.09 K/UL
EOSINOPHIL NFR BLD AUTO: 1.9 %
ERYTHROCYTE [SEDIMENTATION RATE] IN BLOOD BY WESTERGREN METHOD: 13 MM/HR
GLUCOSE SERPL-MCNC: 82 MG/DL
HCT VFR BLD CALC: 44.5 %
HGB BLD-MCNC: 14.1 G/DL
IMM GRANULOCYTES NFR BLD AUTO: 0.2 %
LYMPHOCYTES # BLD AUTO: 1.6 K/UL
LYMPHOCYTES NFR BLD AUTO: 34.3 %
M TB IFN-G BLD-IMP: NEGATIVE
MAN DIFF?: NORMAL
MCHC RBC-ENTMCNC: 30.3 PG
MCHC RBC-ENTMCNC: 31.7 GM/DL
MCV RBC AUTO: 95.5 FL
MONOCYTES # BLD AUTO: 0.42 K/UL
MONOCYTES NFR BLD AUTO: 9 %
NEUTROPHILS # BLD AUTO: 2.5 K/UL
NEUTROPHILS NFR BLD AUTO: 53.7 %
PLATELET # BLD AUTO: 121 K/UL
POTASSIUM SERPL-SCNC: 4.2 MMOL/L
PROT SERPL-MCNC: 6.6 G/DL
QUANTIFERON TB PLUS MITOGEN MINUS NIL: 7.7 IU/ML
QUANTIFERON TB PLUS NIL: 0.15 IU/ML
QUANTIFERON TB PLUS TB1 MINUS NIL: 0.01 IU/ML
QUANTIFERON TB PLUS TB2 MINUS NIL: 0.01 IU/ML
RBC # BLD: 4.66 M/UL
RBC # FLD: 14.1 %
SARS-COV-2 AB SERPL IA-ACNC: >250 U/ML
SARS-COV-2 AB SERPL QL IA: 2.2 INDEX
SODIUM SERPL-SCNC: 142 MMOL/L
WBC # FLD AUTO: 4.66 K/UL

## 2021-12-26 ENCOUNTER — TRANSCRIPTION ENCOUNTER (OUTPATIENT)
Age: 51
End: 2021-12-26

## 2021-12-27 ENCOUNTER — NON-APPOINTMENT (OUTPATIENT)
Age: 51
End: 2021-12-27

## 2021-12-28 ENCOUNTER — NON-APPOINTMENT (OUTPATIENT)
Age: 51
End: 2021-12-28

## 2021-12-29 ENCOUNTER — APPOINTMENT (OUTPATIENT)
Dept: PULMONOLOGY | Facility: CLINIC | Age: 51
End: 2021-12-29
Payer: COMMERCIAL

## 2021-12-29 ENCOUNTER — TRANSCRIPTION ENCOUNTER (OUTPATIENT)
Age: 51
End: 2021-12-29

## 2021-12-29 PROCEDURE — 99213 OFFICE O/P EST LOW 20 MIN: CPT | Mod: 95

## 2021-12-29 RX ORDER — FLUTICASONE PROPIONATE 50 UG/1
50 SPRAY, METERED NASAL
Qty: 96 | Refills: 5 | Status: ACTIVE | COMMUNITY
Start: 2021-04-13 | End: 1900-01-01

## 2021-12-29 NOTE — HISTORY OF PRESENT ILLNESS
[Home] : at home, [unfilled] , at the time of the visit. [Medical Office: ([facility])___] : at the medical office located in  [Verbal consent obtained from patient] : the patient, [unfilled] [FreeTextEntry1] : [place] consultation, repeat COVID infection.\par \par 51-year-old woman with a history of rheumatoid arthritis, on Humira and methotrexate, atrial fibrillation and pulmonary embolism on Xarelto.  Also history of asthma.  Patient had Covid in March 2021, at which time she was unvaccinated.  She has subsequently received two doses of Pfizer in July and August of this year.  Patient works as a nurse.\par \par Her symptoms started a week ago, sore throat, some nasal congestion.  Initial home test was negative.  She repeated her home test on December 25 and came back positive.  She also had a PCR this past Sunday on test 1226 which was positive.\par \par Her symptoms are mild.  She has no shortness of breath.  Her oxygen saturations are normal.  She is afebrile.  She has some mild nasal congestion with a cough when she lies down at night.  She has not really restarted her nasal sprays which helped the last time she had Covid.  She is maintained on her Advair.  She uses albuterol once before she goes to bed for the cough otherwise not requiring any excess albuterol use.\par \par On video, she appears well with some mild nasal congestion but otherwise no distress speaking clearly.\par \par 1 week into Covid, reinfection, also with two vaccinations.  RA on immunosuppression.  But very mild Covid this time.\par \par Restart Flonase and nasal Astelin twice daily to help with the upper airway cough at night\par Continue Advair twice daily.\par \par \par \par

## 2022-01-11 ENCOUNTER — TRANSCRIPTION ENCOUNTER (OUTPATIENT)
Age: 52
End: 2022-01-11

## 2022-01-11 ENCOUNTER — NON-APPOINTMENT (OUTPATIENT)
Age: 52
End: 2022-01-11

## 2022-01-12 ENCOUNTER — RX CHANGE (OUTPATIENT)
Age: 52
End: 2022-01-12

## 2022-01-12 ENCOUNTER — APPOINTMENT (OUTPATIENT)
Dept: CARDIOLOGY | Facility: CLINIC | Age: 52
End: 2022-01-12

## 2022-01-18 ENCOUNTER — NON-APPOINTMENT (OUTPATIENT)
Age: 52
End: 2022-01-18

## 2022-01-21 ENCOUNTER — RX CHANGE (OUTPATIENT)
Age: 52
End: 2022-01-21

## 2022-01-24 ENCOUNTER — TRANSCRIPTION ENCOUNTER (OUTPATIENT)
Age: 52
End: 2022-01-24

## 2022-02-17 ENCOUNTER — APPOINTMENT (OUTPATIENT)
Dept: ORTHOPEDIC SURGERY | Facility: CLINIC | Age: 52
End: 2022-02-17
Payer: COMMERCIAL

## 2022-02-17 VITALS — WEIGHT: 245 LBS | BODY MASS INDEX: 37.13 KG/M2 | HEIGHT: 68 IN

## 2022-02-17 PROCEDURE — 73562 X-RAY EXAM OF KNEE 3: CPT | Mod: RT

## 2022-02-17 PROCEDURE — 99214 OFFICE O/P EST MOD 30 MIN: CPT

## 2022-03-07 ENCOUNTER — TRANSCRIPTION ENCOUNTER (OUTPATIENT)
Age: 52
End: 2022-03-07

## 2022-03-08 ENCOUNTER — TRANSCRIPTION ENCOUNTER (OUTPATIENT)
Age: 52
End: 2022-03-08

## 2022-04-11 PROBLEM — Z11.59 SCREENING FOR VIRAL DISEASE: Status: ACTIVE | Noted: 2020-07-21

## 2022-04-22 ENCOUNTER — RX RENEWAL (OUTPATIENT)
Age: 52
End: 2022-04-22

## 2022-04-22 ENCOUNTER — APPOINTMENT (OUTPATIENT)
Dept: RHEUMATOLOGY | Facility: CLINIC | Age: 52
End: 2022-04-22
Payer: COMMERCIAL

## 2022-04-22 VITALS
HEART RATE: 65 BPM | OXYGEN SATURATION: 96 % | WEIGHT: 251 LBS | BODY MASS INDEX: 38.04 KG/M2 | DIASTOLIC BLOOD PRESSURE: 86 MMHG | TEMPERATURE: 97.2 F | HEIGHT: 68 IN | SYSTOLIC BLOOD PRESSURE: 126 MMHG

## 2022-04-22 DIAGNOSIS — K92.1 MELENA: ICD-10-CM

## 2022-04-22 DIAGNOSIS — Z86.79 PERSONAL HISTORY OF OTHER DISEASES OF THE CIRCULATORY SYSTEM: ICD-10-CM

## 2022-04-22 PROCEDURE — 99214 OFFICE O/P EST MOD 30 MIN: CPT

## 2022-04-25 LAB
ALBUMIN SERPL ELPH-MCNC: 4 G/DL
ALP BLD-CCNC: 73 U/L
ALT SERPL-CCNC: 24 U/L
ANION GAP SERPL CALC-SCNC: 11 MMOL/L
APTT BLD: 28.7 SEC
AST SERPL-CCNC: 20 U/L
BASOPHILS # BLD AUTO: 0.03 K/UL
BASOPHILS NFR BLD AUTO: 0.6 %
BILIRUB SERPL-MCNC: 0.7 MG/DL
BUN SERPL-MCNC: 19 MG/DL
CALCIUM SERPL-MCNC: 9.6 MG/DL
CHLORIDE SERPL-SCNC: 109 MMOL/L
CO2 SERPL-SCNC: 26 MMOL/L
CREAT SERPL-MCNC: 0.83 MG/DL
CRP SERPL-MCNC: <3 MG/L
EGFR: 84 ML/MIN/1.73M2
EOSINOPHIL # BLD AUTO: 0.14 K/UL
EOSINOPHIL NFR BLD AUTO: 2.9 %
ERYTHROCYTE [SEDIMENTATION RATE] IN BLOOD BY WESTERGREN METHOD: 21 MM/HR
GLUCOSE SERPL-MCNC: 75 MG/DL
HBV CORE IGG+IGM SER QL: NONREACTIVE
HBV CORE IGM SER QL: NONREACTIVE
HBV SURFACE AB SER QL: REACTIVE
HBV SURFACE AG SER QL: NONREACTIVE
HCT VFR BLD CALC: 40.6 %
HCV AB SER QL: NONREACTIVE
HCV S/CO RATIO: 0.18 S/CO
HGB BLD-MCNC: 12.4 G/DL
IMM GRANULOCYTES NFR BLD AUTO: 0.2 %
LYMPHOCYTES # BLD AUTO: 1.89 K/UL
LYMPHOCYTES NFR BLD AUTO: 38.6 %
MAN DIFF?: NORMAL
MCHC RBC-ENTMCNC: 29.2 PG
MCHC RBC-ENTMCNC: 30.5 GM/DL
MCV RBC AUTO: 95.5 FL
MONOCYTES # BLD AUTO: 0.51 K/UL
MONOCYTES NFR BLD AUTO: 10.4 %
NEUTROPHILS # BLD AUTO: 2.32 K/UL
NEUTROPHILS NFR BLD AUTO: 47.3 %
PLATELET # BLD AUTO: 204 K/UL
POTASSIUM SERPL-SCNC: 4.3 MMOL/L
PROT SERPL-MCNC: 6.7 G/DL
RBC # BLD: 4.25 M/UL
RBC # FLD: 14.8 %
SODIUM SERPL-SCNC: 145 MMOL/L
WBC # FLD AUTO: 4.9 K/UL

## 2022-09-08 NOTE — PATIENT PROFILE ADULT. - CAREGIVER
No

## 2022-11-02 ENCOUNTER — APPOINTMENT (OUTPATIENT)
Dept: CARDIOLOGY | Facility: CLINIC | Age: 52
End: 2022-11-02

## 2022-11-04 ENCOUNTER — APPOINTMENT (OUTPATIENT)
Dept: RHEUMATOLOGY | Facility: CLINIC | Age: 52
End: 2022-11-04

## 2023-01-25 ENCOUNTER — APPOINTMENT (OUTPATIENT)
Dept: RHEUMATOLOGY | Facility: CLINIC | Age: 53
End: 2023-01-25
Payer: COMMERCIAL

## 2023-01-25 VITALS
WEIGHT: 248 LBS | SYSTOLIC BLOOD PRESSURE: 135 MMHG | HEIGHT: 68 IN | HEART RATE: 64 BPM | OXYGEN SATURATION: 96 % | RESPIRATION RATE: 16 BRPM | BODY MASS INDEX: 37.59 KG/M2 | DIASTOLIC BLOOD PRESSURE: 87 MMHG

## 2023-01-25 PROCEDURE — G0009: CPT | Mod: 59

## 2023-01-25 PROCEDURE — 90677 PCV20 VACCINE IM: CPT

## 2023-01-25 PROCEDURE — 99214 OFFICE O/P EST MOD 30 MIN: CPT

## 2023-01-25 RX ORDER — ZOSTER VACCINE RECOMBINANT, ADJUVANTED 50 MCG/0.5
50 KIT INTRAMUSCULAR
Qty: 1 | Refills: 1 | Status: COMPLETED | COMMUNITY
Start: 2019-12-16 | End: 2023-01-25

## 2023-01-26 NOTE — HISTORY OF PRESENT ILLNESS
[de-identified] : Last was seen in April, 2022 [FreeTextEntry1] : Interval history\par ------------------\par = on MTX and Humira  \par = on and off right knee pain on ambulation and stiffness after sitting, saw ortho- suggested knee sx, on hold for now\par = no pain or swelling of hands/feet- doing well with combination MTX and Humira\par

## 2023-01-26 NOTE — ASSESSMENT
[FreeTextEntry1] : \par Rheumatoid arthritis, \par on MTX 20 mg and Humira q 2 weeks  \par knee secondary OA - left \par s/p right TKR\par DDD L spine\par \par - labs / explained importance of regular monitoring for meds toxicity\par - lower MTX 15 mg a week\par - continue Humira 40 mg sq q 2 weeks\par - PT\par \par \par \par RTO 3 months.

## 2023-01-26 NOTE — PHYSICAL EXAM
[General Appearance - Alert] : alert [Sclera] : the sclera and conjunctiva were normal [General Appearance - In No Acute Distress] : in no acute distress [Nasal Cavity] : the nasal mucosa and septum were normal [Respiration, Rhythm And Depth] : normal respiratory rhythm and effort [Heart Sounds] : normal S1 and S2 [Murmurs] : no murmurs [Edema] : there was no peripheral edema [Bowel Sounds] : normal bowel sounds [Abdomen Soft] : soft [Abdomen Tenderness] : non-tender [Cervical Lymph Nodes Enlarged Anterior Bilaterally] : anterior cervical [Axillary Lymph Nodes Enlarged Bilaterally] : axillary [] : no rash [FreeTextEntry1] : no MCP or PIP tenderness, right knee -valgus deformity, medial joint line tenderness [Sensation] : the sensory exam was normal to light touch and pinprick [Motor Exam] : the motor exam was normal [Oriented To Time, Place, And Person] : oriented to person, place, and time [Impaired Insight] : insight and judgment were intact

## 2023-01-30 DIAGNOSIS — D69.6 THROMBOCYTOPENIA, UNSPECIFIED: ICD-10-CM

## 2023-01-30 LAB
ALBUMIN SERPL ELPH-MCNC: 4.2 G/DL
ALP BLD-CCNC: 78 U/L
ALT SERPL-CCNC: 21 U/L
ANION GAP SERPL CALC-SCNC: 13 MMOL/L
AST SERPL-CCNC: 18 U/L
BASOPHILS # BLD AUTO: 0.05 K/UL
BASOPHILS NFR BLD AUTO: 1 %
BILIRUB SERPL-MCNC: 0.4 MG/DL
BUN SERPL-MCNC: 18 MG/DL
CALCIUM SERPL-MCNC: 9.9 MG/DL
CHLORIDE SERPL-SCNC: 104 MMOL/L
CO2 SERPL-SCNC: 24 MMOL/L
CREAT SERPL-MCNC: 0.84 MG/DL
CRP SERPL-MCNC: <3 MG/L
EGFR: 83 ML/MIN/1.73M2
EOSINOPHIL # BLD AUTO: 0.14 K/UL
EOSINOPHIL NFR BLD AUTO: 2.7 %
ERYTHROCYTE [SEDIMENTATION RATE] IN BLOOD BY WESTERGREN METHOD: 16 MM/HR
GLUCOSE SERPL-MCNC: 59 MG/DL
HBV CORE IGG+IGM SER QL: NONREACTIVE
HBV CORE IGM SER QL: NONREACTIVE
HBV SURFACE AB SER QL: REACTIVE
HBV SURFACE AG SER QL: NONREACTIVE
HCT VFR BLD CALC: 44.3 %
HCV AB SER QL: NONREACTIVE
HCV S/CO RATIO: 0.51 S/CO
HGB BLD-MCNC: 13.9 G/DL
IMM GRANULOCYTES NFR BLD AUTO: 0.2 %
LYMPHOCYTES # BLD AUTO: 1.97 K/UL
LYMPHOCYTES NFR BLD AUTO: 37.5 %
M TB IFN-G BLD-IMP: NEGATIVE
MAN DIFF?: NORMAL
MCHC RBC-ENTMCNC: 29.6 PG
MCHC RBC-ENTMCNC: 31.4 GM/DL
MCV RBC AUTO: 94.3 FL
MONOCYTES # BLD AUTO: 0.66 K/UL
MONOCYTES NFR BLD AUTO: 12.6 %
NEUTROPHILS # BLD AUTO: 2.42 K/UL
NEUTROPHILS NFR BLD AUTO: 46 %
PLATELET # BLD AUTO: 124 K/UL
POTASSIUM SERPL-SCNC: 5.6 MMOL/L
PROT SERPL-MCNC: 7 G/DL
QUANTIFERON TB PLUS MITOGEN MINUS NIL: 9.08 IU/ML
QUANTIFERON TB PLUS NIL: 0.09 IU/ML
QUANTIFERON TB PLUS TB1 MINUS NIL: 0 IU/ML
QUANTIFERON TB PLUS TB2 MINUS NIL: -0.02 IU/ML
RBC # BLD: 4.7 M/UL
RBC # FLD: 16 %
SODIUM SERPL-SCNC: 141 MMOL/L
WBC # FLD AUTO: 5.25 K/UL

## 2023-02-01 LAB — DSDNA AB SER-ACNC: 23 IU/ML

## 2023-02-02 ENCOUNTER — EMERGENCY (EMERGENCY)
Facility: HOSPITAL | Age: 53
LOS: 1 days | Discharge: HOME | End: 2023-02-02
Attending: EMERGENCY MEDICINE | Admitting: EMERGENCY MEDICINE
Payer: COMMERCIAL

## 2023-02-02 VITALS
OXYGEN SATURATION: 100 % | TEMPERATURE: 98 F | SYSTOLIC BLOOD PRESSURE: 140 MMHG | DIASTOLIC BLOOD PRESSURE: 88 MMHG | RESPIRATION RATE: 18 BRPM | HEART RATE: 66 BPM

## 2023-02-02 VITALS
DIASTOLIC BLOOD PRESSURE: 78 MMHG | SYSTOLIC BLOOD PRESSURE: 122 MMHG | HEART RATE: 78 BPM | OXYGEN SATURATION: 100 % | RESPIRATION RATE: 18 BRPM

## 2023-02-02 DIAGNOSIS — Z98.51 TUBAL LIGATION STATUS: Chronic | ICD-10-CM

## 2023-02-02 LAB
ALBUMIN SERPL ELPH-MCNC: 3.8 G/DL — SIGNIFICANT CHANGE UP (ref 3.3–5)
ALBUMIN SERPL ELPH-MCNC: 3.9 G/DL — SIGNIFICANT CHANGE UP (ref 3.3–5)
ALP SERPL-CCNC: 40 U/L — SIGNIFICANT CHANGE UP (ref 40–120)
ALP SERPL-CCNC: 74 U/L — SIGNIFICANT CHANGE UP (ref 40–120)
ALT FLD-CCNC: 21 U/L — SIGNIFICANT CHANGE UP (ref 4–33)
ALT FLD-CCNC: SIGNIFICANT CHANGE UP U/L (ref 4–33)
ANA SER IF-ACNC: NEGATIVE
ANION GAP SERPL CALC-SCNC: 4 MMOL/L — LOW (ref 7–14)
ANION GAP SERPL CALC-SCNC: 6 MMOL/L — LOW (ref 7–14)
AST SERPL-CCNC: 20 U/L — SIGNIFICANT CHANGE UP (ref 4–32)
AST SERPL-CCNC: SIGNIFICANT CHANGE UP U/L (ref 4–32)
BASOPHILS # BLD AUTO: 0.05 K/UL — SIGNIFICANT CHANGE UP (ref 0–0.2)
BASOPHILS NFR BLD AUTO: 0.9 % — SIGNIFICANT CHANGE UP (ref 0–2)
BILIRUB SERPL-MCNC: 0.3 MG/DL — SIGNIFICANT CHANGE UP (ref 0.2–1.2)
BILIRUB SERPL-MCNC: 0.4 MG/DL — SIGNIFICANT CHANGE UP (ref 0.2–1.2)
BUN SERPL-MCNC: 15 MG/DL — SIGNIFICANT CHANGE UP (ref 7–23)
BUN SERPL-MCNC: 15 MG/DL — SIGNIFICANT CHANGE UP (ref 7–23)
CALCIUM SERPL-MCNC: 8.8 MG/DL — SIGNIFICANT CHANGE UP (ref 8.4–10.5)
CALCIUM SERPL-MCNC: 9.4 MG/DL — SIGNIFICANT CHANGE UP (ref 8.4–10.5)
CHLORIDE SERPL-SCNC: 104 MMOL/L — SIGNIFICANT CHANGE UP (ref 98–107)
CHLORIDE SERPL-SCNC: 108 MMOL/L — HIGH (ref 98–107)
CO2 SERPL-SCNC: 23 MMOL/L — SIGNIFICANT CHANGE UP (ref 22–31)
CO2 SERPL-SCNC: 27 MMOL/L — SIGNIFICANT CHANGE UP (ref 22–31)
CREAT SERPL-MCNC: 0.59 MG/DL — SIGNIFICANT CHANGE UP (ref 0.5–1.3)
CREAT SERPL-MCNC: 0.82 MG/DL — SIGNIFICANT CHANGE UP (ref 0.5–1.3)
EGFR: 107 ML/MIN/1.73M2 — SIGNIFICANT CHANGE UP
EGFR: 85 ML/MIN/1.73M2 — SIGNIFICANT CHANGE UP
ELLIPTOCYTES BLD QL SMEAR: SIGNIFICANT CHANGE UP
ENA RNP AB SER IA-ACNC: 0.3 AL
ENA SM AB SER IA-ACNC: <0.2 AL
EOSINOPHIL # BLD AUTO: 0.14 K/UL — SIGNIFICANT CHANGE UP (ref 0–0.5)
EOSINOPHIL NFR BLD AUTO: 2.6 % — SIGNIFICANT CHANGE UP (ref 0–6)
FLUAV AG NPH QL: SIGNIFICANT CHANGE UP
FLUBV AG NPH QL: SIGNIFICANT CHANGE UP
GIANT PLATELETS BLD QL SMEAR: PRESENT — SIGNIFICANT CHANGE UP
GLUCOSE SERPL-MCNC: 83 MG/DL — SIGNIFICANT CHANGE UP (ref 70–99)
GLUCOSE SERPL-MCNC: 87 MG/DL — SIGNIFICANT CHANGE UP (ref 70–99)
HCT VFR BLD CALC: 45.9 % — HIGH (ref 34.5–45)
HGB BLD-MCNC: 14.2 G/DL — SIGNIFICANT CHANGE UP (ref 11.5–15.5)
IANC: 1.93 K/UL — SIGNIFICANT CHANGE UP (ref 1.8–7.4)
LYMPHOCYTES # BLD AUTO: 1.95 K/UL — SIGNIFICANT CHANGE UP (ref 1–3.3)
LYMPHOCYTES # BLD AUTO: 37.2 % — SIGNIFICANT CHANGE UP (ref 13–44)
MAGNESIUM SERPL-MCNC: 2.2 MG/DL — SIGNIFICANT CHANGE UP (ref 1.6–2.6)
MANUAL SMEAR VERIFICATION: SIGNIFICANT CHANGE UP
MCHC RBC-ENTMCNC: 28.4 PG — SIGNIFICANT CHANGE UP (ref 27–34)
MCHC RBC-ENTMCNC: 30.9 GM/DL — LOW (ref 32–36)
MCV RBC AUTO: 91.8 FL — SIGNIFICANT CHANGE UP (ref 80–100)
MONOCYTES # BLD AUTO: 0.23 K/UL — SIGNIFICANT CHANGE UP (ref 0–0.9)
MONOCYTES NFR BLD AUTO: 4.4 % — SIGNIFICANT CHANGE UP (ref 2–14)
NEUTROPHILS # BLD AUTO: 2.22 K/UL — SIGNIFICANT CHANGE UP (ref 1.8–7.4)
NEUTROPHILS NFR BLD AUTO: 42.5 % — LOW (ref 43–77)
NT-PROBNP SERPL-SCNC: 120 PG/ML — SIGNIFICANT CHANGE UP
PHOSPHATE SERPL-MCNC: SIGNIFICANT CHANGE UP MG/DL (ref 2.5–4.5)
PLAT MORPH BLD: NORMAL — SIGNIFICANT CHANGE UP
PLATELET # BLD AUTO: 124 K/UL — LOW (ref 150–400)
PLATELET COUNT - ESTIMATE: ABNORMAL
POIKILOCYTOSIS BLD QL AUTO: SIGNIFICANT CHANGE UP
POTASSIUM SERPL-MCNC: 4.2 MMOL/L — SIGNIFICANT CHANGE UP (ref 3.5–5.3)
POTASSIUM SERPL-MCNC: SIGNIFICANT CHANGE UP MMOL/L (ref 3.5–5.3)
POTASSIUM SERPL-SCNC: 4.2 MMOL/L — SIGNIFICANT CHANGE UP (ref 3.5–5.3)
POTASSIUM SERPL-SCNC: SIGNIFICANT CHANGE UP MMOL/L (ref 3.5–5.3)
PROT SERPL-MCNC: 7.3 G/DL — SIGNIFICANT CHANGE UP (ref 6–8.3)
PROT SERPL-MCNC: SIGNIFICANT CHANGE UP G/DL (ref 6–8.3)
RBC # BLD: 5 M/UL — SIGNIFICANT CHANGE UP (ref 3.8–5.2)
RBC # FLD: 15.8 % — HIGH (ref 10.3–14.5)
RBC BLD AUTO: ABNORMAL
RSV RNA NPH QL NAA+NON-PROBE: SIGNIFICANT CHANGE UP
SARS-COV-2 RNA SPEC QL NAA+PROBE: SIGNIFICANT CHANGE UP
SMUDGE CELLS # BLD: PRESENT — SIGNIFICANT CHANGE UP
SODIUM SERPL-SCNC: 131 MMOL/L — LOW (ref 135–145)
SODIUM SERPL-SCNC: 141 MMOL/L — SIGNIFICANT CHANGE UP (ref 135–145)
TROPONIN T, HIGH SENSITIVITY RESULT: <6 NG/L — SIGNIFICANT CHANGE UP
TSH SERPL-MCNC: 2.16 UIU/ML — SIGNIFICANT CHANGE UP (ref 0.27–4.2)
VARIANT LYMPHS # BLD: 12.4 % — HIGH (ref 0–6)
WBC # BLD: 5.23 K/UL — SIGNIFICANT CHANGE UP (ref 3.8–10.5)
WBC # FLD AUTO: 5.23 K/UL — SIGNIFICANT CHANGE UP (ref 3.8–10.5)

## 2023-02-02 PROCEDURE — 99285 EMERGENCY DEPT VISIT HI MDM: CPT

## 2023-02-02 PROCEDURE — 71045 X-RAY EXAM CHEST 1 VIEW: CPT | Mod: 26

## 2023-02-02 RX ORDER — DILTIAZEM HCL 120 MG
120 CAPSULE, EXT RELEASE 24 HR ORAL ONCE
Refills: 0 | Status: COMPLETED | OUTPATIENT
Start: 2023-02-02 | End: 2023-02-02

## 2023-02-02 RX ORDER — SODIUM CHLORIDE 9 MG/ML
1000 INJECTION INTRAMUSCULAR; INTRAVENOUS; SUBCUTANEOUS ONCE
Refills: 0 | Status: DISCONTINUED | OUTPATIENT
Start: 2023-02-02 | End: 2023-02-02

## 2023-02-02 RX ORDER — DILTIAZEM HCL 120 MG
10 CAPSULE, EXT RELEASE 24 HR ORAL ONCE
Refills: 0 | Status: COMPLETED | OUTPATIENT
Start: 2023-02-02 | End: 2023-02-02

## 2023-02-02 RX ADMIN — Medication 10 MILLIGRAM(S): at 08:47

## 2023-02-02 RX ADMIN — Medication 120 MILLIGRAM(S): at 09:26

## 2023-02-02 NOTE — ED PROVIDER NOTE - OBJECTIVE STATEMENT
52-year-old female with a history of rheumatoid arthritis on methotrexate A. fib and remote PE presents after waking up from sleep at 5:00 this morning with palpitations shortness of breath diaphoresis and nausea. no chest pain at the time.  States that she missed her doses of xarelto and Cardizem yesterday morning, so took them both last evening.  At time of interview, persistently short of breath with generalized fatigue/weakness. No nausea vomiting diaphoresis at this time.  No recent illness no fevers chills cough abdominal pain vomiting diarrhea lower extremity swelling or pain. No hx anemia. Postmenopausal since her 40s without vaginal discharge or bleeding.

## 2023-02-02 NOTE — ED ADULT NURSE NOTE - NEURO MENTATION
1  Please see the post cardiac catheterization dishcarge instructions  No heavy lifting, greater than 10 lbs  or strenuous  activity for 48 hrs  2 Remove band aid tomorrow  Shower and wash area- wrist gently with soap and water- beginning tomorrow  Rinse and pat dry  Apply new water seal band aid  Repeat this process for 5 days  No powders, creams lotions or antibiotic ointments  for 5 days  No tub baths, hot tubs or swimming for 5 days  3  Please call our office ([phone]) if you have any fever, redness, swelling, discharge from your wrist access site      4 No driving for 2 days normal

## 2023-02-02 NOTE — ED ADULT TRIAGE NOTE - CHIEF COMPLAINT QUOTE
hx Afib with daily xarelto- p/w palpitations this morning and states she "feels shes in Afib"- HR fluctuating in triage 30s-120s

## 2023-02-02 NOTE — ED PROVIDER NOTE - PROGRESS NOTE DETAILS
YAKOV Douglas PGY2 HR now controlled after half-dose IV cardizem. home PO cardizem ordered. pt feeling improved, still tired from prolonged palpitations. labs received pending results. YAKOV Douglas PGY2 pt still feeling well except for fatigue. rpt ecg being obtained. PO meds given. labs hemolyzed, rpt ordered. YAKOV Douglas PGY2 HR remains <100. labs reassuring. pending repeat lytes and trop results. YAKOV Douglas PGY2 initial troponin 7 (first specimen disregarded due to hemolysis). obtained >3h after onset of symptoms. no need for repeat at this time. pt is asymptomatic and was able to ambulate unassisted to the restroom. Dr. Sweeney's office is aware that the pt was seen and treated for AFib RVR with good response and will reach out to the pt for expedited follow up. Pt is aware and agrees to go to the follow up appointment.

## 2023-02-02 NOTE — ED ADULT NURSE NOTE - NSIMPLEMENTINTERV_GEN_ALL_ED
Implemented All Fall with Harm Risk Interventions:  [place] to call system. Call bell, personal items and telephone within reach. Instruct patient to call for assistance. Room bathroom lighting operational. Non-slip footwear when patient is off stretcher. Physically safe environment: no spills, clutter or unnecessary equipment. Stretcher in lowest position, wheels locked, appropriate side rails in place. Provide visual cue, wrist band, yellow gown, etc. Monitor gait and stability. Monitor for mental status changes and reorient to person, place, and time. Review medications for side effects contributing to fall risk. Reinforce activity limits and safety measures with patient and family. Provide visual clues: red socks.

## 2023-02-02 NOTE — ED PROVIDER NOTE - NSICDXPASTMEDICALHX_GEN_ALL_CORE_FT
PAST MEDICAL HISTORY:  GERD (gastroesophageal reflux disease)     Obesity     RA (rheumatoid arthritis)     Uterine leiomyoma

## 2023-02-02 NOTE — ED PROVIDER NOTE - NS ED ROS FT
Constitutional: no fevers, chills  HEENT: no HA, vision changes, rhinorrhea, sore throat  Cardiac: no chest pain, +palpitations  Respiratory: +SOB, no cough or hemoptysis  GI: no n/v/d/c, abd pain, bloody or dark stools  : no dysuria, frequency, or hematuria  MSK: no joint pain, neck pain or back pain  Skin: no rashes, jaundice, pruritis  Neuro: no numbness/tingling, weakness, unsteady gait  Psych: no suicidal thoughts  ROS otherwise negative except per HPI

## 2023-02-02 NOTE — ED PROVIDER NOTE - ATTENDING CONTRIBUTION TO CARE
I performed a face-to-face evaluation of the patient and performed a history and physical examination. I agree with the history and physical examination. If this was a PA visit, I personally saw the patient with the PA and performed a substantive portion of the visit including all aspects of the medical decision making.    A. fib, PE (Xarelto), GERD, RA (MTX). Missed a dose of 2 HR-lowering meds yest. This AM, palpitations, SOB, sweaty, nausea. . No F/chills. DDx: likely a. fib w/ RVR 2/2 missing HR-lowering meds. Consider complications such as CHF. Check trop, TSH, BNP, CXR. Give Cardizem. Re-assess.

## 2023-02-02 NOTE — ED PROVIDER NOTE - NSFOLLOWUPINSTRUCTIONS_ED_ALL_ED_FT
You were seen in the Emergency Department for rapid atrial fibrillation.     1) Advance activity as tolerated.   2) Continue all previously prescribed medications as directed.    3) Follow up with your primary care physician in 24-48 hours - take copies of your results.    4) Return to the Emergency Department for worsening or persistent symptoms, and/or ANY NEW OR CONCERNING SYMPTOMS.      If you notice any worsening symptoms including chest pain, sweating, shortness of breath, numbness, tingling, vision changes, vomiting, diarrhea, or any other new concerning symptoms please immediately seek medical attention or return to the ER. Resume your normal medication schedule until you are seen by Dr. Sweeney.     Your lung Xray shows mild increased fluid in the lungs. Please tell Dr. Sweeney that there is cephalization of the pulmonary vessels. You may or may not need to go on a water pill if this is persistent, but this is up to your cardiologist. Please let them know if you develop any swelling in the legs or other evidence of fluid build up in the body.

## 2023-02-02 NOTE — ED ADULT NURSE REASSESSMENT NOTE - NS ED NURSE REASSESS COMMENT FT1
patient reports sob, headache and palpitations has improved, still feeling fatigued, but besides that denies any other discomfort. cardiac monitoring maintained. NAD noted. will continue to montior

## 2023-02-02 NOTE — ED ADULT NURSE NOTE - OBJECTIVE STATEMENT
53 y/o female with hx PE & AFib on Cardizem and Xarelto presents to the ED for palpitations. Pt reports she woke up at 5AM this morning for working and felt like she "had just run a marathon". Pt also endorsing SOB but able to speak in full, complete sentences. Pt reports compliance with daily meds but did not take cardiac meds this AM due to rushing to ED. Pt denies HA, CP, n/v, diaphoresis, dizziness, syncope, leg pain or any other c/o. Pt noted in AFib to low 100s on cardiac monitor.

## 2023-02-02 NOTE — ED PROVIDER NOTE - PATIENT PORTAL LINK FT
You can access the FollowMyHealth Patient Portal offered by [facility] by registering at the following website: [email or web address]. By joining [employer]’s FollowMyHealth portal, you will also be able to view your health information using other applications (apps) compatible with our system.

## 2023-02-02 NOTE — ED PROVIDER NOTE - CLINICAL SUMMARY MEDICAL DECISION MAKING FREE TEXT BOX
Mauro: A. fib, PE (Xarelto), GERD, RA (MTX). Missed a dose of 2 HR-lowering meds yest. This AM, palpitations, SOB, sweaty, nausea. . No F/chills. DDx: likely a. fib w/ RVR 2/2 missing HR-lowering meds. Consider complications such as CHF. Check trop, TSH, BNP, CXR. Give Cardizem. Re-assess.

## 2023-02-02 NOTE — ED ADULT NURSE REASSESSMENT NOTE - NS ED NURSE REASSESS COMMENT FT1
assumed care of patient, patient c/o feeling sob and palpitations. endorses pmhx of PE and states her symptoms feels the same. cardiac monitoring maintained, AFib on the monitor. respirations even and unlabored, speaking in full complete sentences, breathing spontaneously on RA, sating at 98% on RA. pending lab results and images.

## 2023-02-02 NOTE — ED PROVIDER NOTE - NSICDXFAMILYHX_GEN_ALL_CORE_FT
FAMILY HISTORY:  Sibling  Still living? No  Family history of coronary artery bypass graft surgery, Age at diagnosis: Age Unknown  Family history of diabetes mellitus (DM), Age at diagnosis: Age Unknown  Family history of stroke, Age at diagnosis: Age Unknown

## 2023-02-02 NOTE — ED PROVIDER NOTE - PHYSICAL EXAMINATION
- - - General: non-toxic, NAD  HEENT: NCAT, PERRL, oropharyngeal AW patent no conjunctival pallor  Cardiac: tachycardic, irregular, no murmurs, 2+ peripheral pulses  Resp: CTAB, normal rate  Abdomen: soft, non-distended, bowel sounds present, no ttp, no rebound or guarding. no organomegaly  Extremities: no peripheral edema, calf tenderness, or leg size discrepancies  Skin: warm and dry no rashes  Neuro: AAOx4, 5+motor, sensation grossly intact  Psych: mood and affect appropriate

## 2023-02-10 ENCOUNTER — APPOINTMENT (OUTPATIENT)
Dept: CARDIOLOGY | Facility: CLINIC | Age: 53
End: 2023-02-10
Payer: COMMERCIAL

## 2023-02-10 ENCOUNTER — NON-APPOINTMENT (OUTPATIENT)
Age: 53
End: 2023-02-10

## 2023-02-10 VITALS
TEMPERATURE: 97.6 F | OXYGEN SATURATION: 95 % | WEIGHT: 255 LBS | SYSTOLIC BLOOD PRESSURE: 113 MMHG | HEIGHT: 68 IN | DIASTOLIC BLOOD PRESSURE: 79 MMHG | HEART RATE: 78 BPM | BODY MASS INDEX: 38.65 KG/M2

## 2023-02-10 PROCEDURE — 99214 OFFICE O/P EST MOD 30 MIN: CPT | Mod: 25

## 2023-02-10 PROCEDURE — 93000 ELECTROCARDIOGRAM COMPLETE: CPT

## 2023-02-10 NOTE — ASSESSMENT
[FreeTextEntry1] : 1. Check echo for recurrent afib. First episode for approximately 6 years. Continue Xarelto.\par 2. We discussed stress reduction and the role of sleep deprivation in afib.

## 2023-02-10 NOTE — REASON FOR VISIT
[Symptom and Test Evaluation] : symptom and test evaluation [Arrhythmia/ECG Abnorrmalities] : arrhythmia/ECG abnormalities [FreeTextEntry1] : Had episode of atrial fibrillation, was seen in ED, responded to dilitazem. On Xarelto.\par Under severe psychosocial stressors, sleep is disrupted. Resting HR now 80s.\par \par 1. Check echo for recurrent afib. First episode for approximately 6 years. Continue Xarelto.\par 2. We discussed stress reduction and the role of sleep deprivation in afib.

## 2023-02-21 ENCOUNTER — OUTPATIENT (OUTPATIENT)
Dept: OUTPATIENT SERVICES | Facility: HOSPITAL | Age: 53
LOS: 1 days | End: 2023-02-21
Payer: COMMERCIAL

## 2023-02-21 ENCOUNTER — APPOINTMENT (OUTPATIENT)
Dept: CV DIAGNOSITCS | Facility: HOSPITAL | Age: 53
End: 2023-02-21

## 2023-02-21 DIAGNOSIS — Z98.51 TUBAL LIGATION STATUS: Chronic | ICD-10-CM

## 2023-02-21 DIAGNOSIS — I48.0 PAROXYSMAL ATRIAL FIBRILLATION: ICD-10-CM

## 2023-02-21 PROCEDURE — 93306 TTE W/DOPPLER COMPLETE: CPT | Mod: 26

## 2023-02-27 ENCOUNTER — TRANSCRIPTION ENCOUNTER (OUTPATIENT)
Age: 53
End: 2023-02-27

## 2023-03-06 ENCOUNTER — RX RENEWAL (OUTPATIENT)
Age: 53
End: 2023-03-06

## 2023-04-19 ENCOUNTER — APPOINTMENT (OUTPATIENT)
Dept: RHEUMATOLOGY | Facility: CLINIC | Age: 53
End: 2023-04-19
Payer: COMMERCIAL

## 2023-04-19 ENCOUNTER — RESULT REVIEW (OUTPATIENT)
Age: 53
End: 2023-04-19

## 2023-04-19 VITALS
TEMPERATURE: 97.1 F | HEIGHT: 68 IN | DIASTOLIC BLOOD PRESSURE: 88 MMHG | HEART RATE: 80 BPM | OXYGEN SATURATION: 98 % | RESPIRATION RATE: 16 BRPM | WEIGHT: 259 LBS | BODY MASS INDEX: 39.25 KG/M2 | SYSTOLIC BLOOD PRESSURE: 123 MMHG

## 2023-04-19 DIAGNOSIS — Z78.0 ASYMPTOMATIC MENOPAUSAL STATE: ICD-10-CM

## 2023-04-19 DIAGNOSIS — M17.9 OSTEOARTHRITIS OF KNEE, UNSPECIFIED: ICD-10-CM

## 2023-04-19 PROCEDURE — 99214 OFFICE O/P EST MOD 30 MIN: CPT

## 2023-04-20 LAB
ALBUMIN SERPL ELPH-MCNC: 3.9 G/DL
ALP BLD-CCNC: 78 U/L
ALT SERPL-CCNC: 38 U/L
ANION GAP SERPL CALC-SCNC: 10 MMOL/L
AST SERPL-CCNC: 26 U/L
BASOPHILS # BLD AUTO: 0.03 K/UL
BASOPHILS NFR BLD AUTO: 0.5 %
BILIRUB SERPL-MCNC: 0.4 MG/DL
BUN SERPL-MCNC: 24 MG/DL
CALCIUM SERPL-MCNC: 9.8 MG/DL
CHLORIDE SERPL-SCNC: 107 MMOL/L
CO2 SERPL-SCNC: 24 MMOL/L
CREAT SERPL-MCNC: 0.96 MG/DL
CRP SERPL-MCNC: <3 MG/L
EGFR: 70 ML/MIN/1.73M2
EOSINOPHIL # BLD AUTO: 0.13 K/UL
EOSINOPHIL NFR BLD AUTO: 2.2 %
ERYTHROCYTE [SEDIMENTATION RATE] IN BLOOD BY WESTERGREN METHOD: 23 MM/HR
GLUCOSE SERPL-MCNC: 92 MG/DL
HCT VFR BLD CALC: 44.7 %
HGB BLD-MCNC: 14.5 G/DL
IMM GRANULOCYTES NFR BLD AUTO: 0.2 %
LYMPHOCYTES # BLD AUTO: 2.46 K/UL
LYMPHOCYTES NFR BLD AUTO: 42.2 %
MAN DIFF?: NORMAL
MCHC RBC-ENTMCNC: 31.1 PG
MCHC RBC-ENTMCNC: 32.4 GM/DL
MCV RBC AUTO: 95.9 FL
MONOCYTES # BLD AUTO: 0.37 K/UL
MONOCYTES NFR BLD AUTO: 6.3 %
NEUTROPHILS # BLD AUTO: 2.83 K/UL
NEUTROPHILS NFR BLD AUTO: 48.6 %
PLATELET # BLD AUTO: 128 K/UL
POTASSIUM SERPL-SCNC: 4.2 MMOL/L
PROT SERPL-MCNC: 6.9 G/DL
RBC # BLD: 4.66 M/UL
RBC # FLD: 15 %
SODIUM SERPL-SCNC: 141 MMOL/L
WBC # FLD AUTO: 5.83 K/UL

## 2023-04-20 NOTE — HISTORY OF PRESENT ILLNESS
[de-identified] : Last was seen in January, 2023 [FreeTextEntry1] : Interval history\par ------------------\par = on MTX and Humira  \par = on and off right knee pain on ambulation and stiffness after sitting, saw ortho- suggested knee sx, on hold for now\par = no pain or swelling of hands/feet- doing well with combination MTX and Humira\par

## 2023-04-20 NOTE — PHYSICAL EXAM
[General Appearance - Alert] : alert [General Appearance - In No Acute Distress] : in no acute distress [Sclera] : the sclera and conjunctiva were normal [Nasal Cavity] : the nasal mucosa and septum were normal [Respiration, Rhythm And Depth] : normal respiratory rhythm and effort [Heart Sounds] : normal S1 and S2 [Murmurs] : no murmurs [Edema] : there was no peripheral edema [Bowel Sounds] : normal bowel sounds [Abdomen Soft] : soft [Abdomen Tenderness] : non-tender [Cervical Lymph Nodes Enlarged Anterior Bilaterally] : anterior cervical [Axillary Lymph Nodes Enlarged Bilaterally] : axillary [FreeTextEntry1] : no MCP or PIP tenderness, right knee -valgus deformity, medial joint line tenderness [] : no rash [Sensation] : the sensory exam was normal to light touch and pinprick [Motor Exam] : the motor exam was normal [Oriented To Time, Place, And Person] : oriented to person, place, and time [Impaired Insight] : insight and judgment were intact

## 2023-04-20 NOTE — ASSESSMENT
[FreeTextEntry1] : \par Rheumatoid arthritis, \par on MTX 20 mg and Humira q 2 weeks  \par knee secondary OA - left \par s/p right TKR\par DDD L spine\par evaluate for Osteoporosis\par \par - labs / explained importance of regular monitoring for meds toxicity\par - lower MTX 15 mg a week\par - continue Humira 40 mg sq q 2 weeks\par - xray knees\par - PT\par - DEXA \par \par \par \par RTO 3 months.

## 2023-05-01 ENCOUNTER — RX RENEWAL (OUTPATIENT)
Age: 53
End: 2023-05-01

## 2023-05-05 ENCOUNTER — APPOINTMENT (OUTPATIENT)
Dept: RADIOLOGY | Facility: IMAGING CENTER | Age: 53
End: 2023-05-05
Payer: COMMERCIAL

## 2023-05-05 ENCOUNTER — OUTPATIENT (OUTPATIENT)
Dept: OUTPATIENT SERVICES | Facility: HOSPITAL | Age: 53
LOS: 1 days | End: 2023-05-05
Payer: COMMERCIAL

## 2023-05-05 DIAGNOSIS — Z78.0 ASYMPTOMATIC MENOPAUSAL STATE: ICD-10-CM

## 2023-05-05 DIAGNOSIS — M25.569 PAIN IN UNSPECIFIED KNEE: ICD-10-CM

## 2023-05-05 DIAGNOSIS — Z98.51 TUBAL LIGATION STATUS: Chronic | ICD-10-CM

## 2023-05-05 DIAGNOSIS — M17.9 OSTEOARTHRITIS OF KNEE, UNSPECIFIED: ICD-10-CM

## 2023-05-05 PROCEDURE — 73560 X-RAY EXAM OF KNEE 1 OR 2: CPT | Mod: 26,RT

## 2023-05-05 PROCEDURE — 73562 X-RAY EXAM OF KNEE 3: CPT | Mod: 26,LT

## 2023-05-05 PROCEDURE — 73565 X-RAY EXAM OF KNEES: CPT

## 2023-05-05 PROCEDURE — 77080 DXA BONE DENSITY AXIAL: CPT

## 2023-05-05 PROCEDURE — 77080 DXA BONE DENSITY AXIAL: CPT | Mod: 26

## 2023-05-05 PROCEDURE — 73562 X-RAY EXAM OF KNEE 3: CPT

## 2023-07-05 ENCOUNTER — RX RENEWAL (OUTPATIENT)
Age: 53
End: 2023-07-05

## 2023-07-05 ENCOUNTER — TRANSCRIPTION ENCOUNTER (OUTPATIENT)
Age: 53
End: 2023-07-05

## 2023-07-10 ENCOUNTER — TRANSCRIPTION ENCOUNTER (OUTPATIENT)
Age: 53
End: 2023-07-10

## 2023-07-10 ENCOUNTER — RX CHANGE (OUTPATIENT)
Age: 53
End: 2023-07-10

## 2023-08-16 ENCOUNTER — APPOINTMENT (OUTPATIENT)
Dept: RHEUMATOLOGY | Facility: CLINIC | Age: 53
End: 2023-08-16
Payer: COMMERCIAL

## 2023-08-16 VITALS
BODY MASS INDEX: 38.8 KG/M2 | SYSTOLIC BLOOD PRESSURE: 135 MMHG | HEIGHT: 68 IN | WEIGHT: 256 LBS | HEART RATE: 60 BPM | DIASTOLIC BLOOD PRESSURE: 84 MMHG | OXYGEN SATURATION: 96 %

## 2023-08-16 DIAGNOSIS — K21.9 GASTRO-ESOPHAGEAL REFLUX DISEASE W/OUT ESOPHAGITIS: ICD-10-CM

## 2023-08-16 PROCEDURE — 99214 OFFICE O/P EST MOD 30 MIN: CPT

## 2023-08-16 RX ORDER — PANTOPRAZOLE 40 MG/1
40 TABLET, DELAYED RELEASE ORAL
Qty: 90 | Refills: 0 | Status: ACTIVE | COMMUNITY
Start: 2023-08-16 | End: 1900-01-01

## 2023-08-17 NOTE — PHYSICAL EXAM
[General Appearance - Alert] : alert [General Appearance - In No Acute Distress] : in no acute distress [Sclera] : the sclera and conjunctiva were normal [Nasal Cavity] : the nasal mucosa and septum were normal [Respiration, Rhythm And Depth] : normal respiratory rhythm and effort [Heart Sounds] : normal S1 and S2 [Murmurs] : no murmurs [Edema] : there was no peripheral edema [Bowel Sounds] : normal bowel sounds [Abdomen Soft] : soft [Abdomen Tenderness] : non-tender [Axillary Lymph Nodes Enlarged Bilaterally] : axillary [Cervical Lymph Nodes Enlarged Anterior Bilaterally] : anterior cervical [FreeTextEntry1] : no MCP or PIP tenderness, right knee -valgus deformity, medial joint line tenderness [] : no rash [Sensation] : the sensory exam was normal to light touch and pinprick [Motor Exam] : the motor exam was normal [Impaired Insight] : insight and judgment were intact [Oriented To Time, Place, And Person] : oriented to person, place, and time

## 2023-08-17 NOTE — HISTORY OF PRESENT ILLNESS
[de-identified] : Last was seen in April, 2023 [FreeTextEntry1] : Interval history ------------------ = on MTX and Humira   = on and off left knee pain on ambulation and stiffness after sitting  = no pain or swelling of hands/feet- doing well with combination MTX and Humira

## 2023-08-17 NOTE — ASSESSMENT
[FreeTextEntry1] : Rheumatoid arthritis,  on MTX 15 mg and Humira q 2 weeks   knee secondary OA - left  s/p right TKR DDD L spine  Evaluation for Osteoporosis - negative, last DEXA 5/2023  - labs / explained importance of regular monitoring for meds toxicity - lower MTX 15 mg a week - continue Humira 40 mg sq q 2 weeks - PT   RTO 3 months.

## 2023-08-17 NOTE — HISTORY OF PRESENT ILLNESS
[de-identified] : Last was seen in April, 2023 [FreeTextEntry1] : Interval history ------------------ = on MTX and Humira   = on and off left knee pain on ambulation and stiffness after sitting  = no pain or swelling of hands/feet- doing well with combination MTX and Humira

## 2023-08-18 LAB
ALBUMIN SERPL ELPH-MCNC: 4.4 G/DL
ALP BLD-CCNC: 77 U/L
ALT SERPL-CCNC: 29 U/L
ANION GAP SERPL CALC-SCNC: 14 MMOL/L
AST SERPL-CCNC: 24 U/L
BASOPHILS # BLD AUTO: 0.03 K/UL
BASOPHILS NFR BLD AUTO: 0.5 %
BILIRUB SERPL-MCNC: 0.6 MG/DL
BUN SERPL-MCNC: 20 MG/DL
CALCIUM SERPL-MCNC: 10.1 MG/DL
CHLORIDE SERPL-SCNC: 105 MMOL/L
CO2 SERPL-SCNC: 21 MMOL/L
CREAT SERPL-MCNC: 0.75 MG/DL
CRP SERPL-MCNC: <3 MG/L
EGFR: 94 ML/MIN/1.73M2
ENA SS-A AB SER IA-ACNC: <0.2 AL
ENA SS-B AB SER IA-ACNC: <0.2 AL
EOSINOPHIL # BLD AUTO: 0.09 K/UL
EOSINOPHIL NFR BLD AUTO: 1.6 %
ERYTHROCYTE [SEDIMENTATION RATE] IN BLOOD BY WESTERGREN METHOD: 19 MM/HR
GLUCOSE SERPL-MCNC: 74 MG/DL
HCT VFR BLD CALC: 46.2 %
HGB BLD-MCNC: 14.7 G/DL
IMM GRANULOCYTES NFR BLD AUTO: 0.2 %
LYMPHOCYTES # BLD AUTO: 2.55 K/UL
LYMPHOCYTES NFR BLD AUTO: 45.1 %
MAN DIFF?: NORMAL
MCHC RBC-ENTMCNC: 30.1 PG
MCHC RBC-ENTMCNC: 31.8 GM/DL
MCV RBC AUTO: 94.5 FL
MONOCYTES # BLD AUTO: 0.4 K/UL
MONOCYTES NFR BLD AUTO: 7.1 %
NEUTROPHILS # BLD AUTO: 2.58 K/UL
NEUTROPHILS NFR BLD AUTO: 45.5 %
PLATELET # BLD AUTO: 117 K/UL
POTASSIUM SERPL-SCNC: 4.6 MMOL/L
PROT SERPL-MCNC: 7.2 G/DL
RBC # BLD: 4.89 M/UL
RBC # FLD: 14.7 %
SODIUM SERPL-SCNC: 140 MMOL/L
WBC # FLD AUTO: 5.66 K/UL

## 2023-10-04 ENCOUNTER — RX CHANGE (OUTPATIENT)
Age: 53
End: 2023-10-04

## 2023-10-04 RX ORDER — RIVAROXABAN 20 MG/1
20 TABLET, FILM COATED ORAL
Qty: 90 | Refills: 3 | Status: ACTIVE | COMMUNITY
Start: 2023-10-04 | End: 1900-01-01

## 2023-10-04 RX ORDER — RIVAROXABAN 20 MG/1
20 TABLET, FILM COATED ORAL
Qty: 90 | Refills: 3 | Status: DISCONTINUED | COMMUNITY
Start: 2023-07-10 | End: 2023-10-04

## 2023-10-21 ENCOUNTER — RX RENEWAL (OUTPATIENT)
Age: 53
End: 2023-10-21

## 2023-12-02 ENCOUNTER — NON-APPOINTMENT (OUTPATIENT)
Age: 53
End: 2023-12-02

## 2023-12-03 ENCOUNTER — EMERGENCY (EMERGENCY)
Facility: HOSPITAL | Age: 53
LOS: 1 days | Discharge: HOME | End: 2023-12-03
Admitting: EMERGENCY MEDICINE
Payer: COMMERCIAL

## 2023-12-03 VITALS
OXYGEN SATURATION: 98 % | SYSTOLIC BLOOD PRESSURE: 138 MMHG | DIASTOLIC BLOOD PRESSURE: 87 MMHG | RESPIRATION RATE: 18 BRPM | HEART RATE: 64 BPM | TEMPERATURE: 98 F

## 2023-12-03 DIAGNOSIS — Z98.51 TUBAL LIGATION STATUS: Chronic | ICD-10-CM

## 2023-12-03 PROCEDURE — 99284 EMERGENCY DEPT VISIT MOD MDM: CPT

## 2023-12-03 PROCEDURE — 93010 ELECTROCARDIOGRAM REPORT: CPT

## 2023-12-03 RX ORDER — MORPHINE SULFATE 50 MG/1
2 CAPSULE, EXTENDED RELEASE ORAL ONCE
Refills: 0 | Status: DISCONTINUED | OUTPATIENT
Start: 2023-12-03 | End: 2023-12-03

## 2023-12-03 RX ORDER — LIDOCAINE HYDROCHLORIDE AND EPINEPHRINE 10; 10 MG/ML; UG/ML
100 INJECTION, SOLUTION INFILTRATION; PERINEURAL ONCE
Refills: 0 | Status: DISCONTINUED | OUTPATIENT
Start: 2023-12-03 | End: 2023-12-04

## 2023-12-03 RX ADMIN — MORPHINE SULFATE 2 MILLIGRAM(S): 50 CAPSULE, EXTENDED RELEASE ORAL at 23:25

## 2023-12-03 NOTE — ED PROVIDER NOTE - CARE PROVIDERS DIRECT ADDRESSES
[email or web address]

## 2023-12-03 NOTE — ED PROVIDER NOTE - PROGRESS NOTE DETAILS
SASKIA Hazel: Was signed out to me to follow-up CBC H&H is 13.9/43  Patient was seen by surgery and thrombosed hemorrhoid excised..

## 2023-12-03 NOTE — ED PROVIDER NOTE - CARE PROVIDER_API CALL
Joby Whitley  Colon/Rectal Surgery  [address], [address]  [place] [address]  Phone: [phone]  Fax: [phone]  Follow Up Time:    Joby Whitley  Colon/Rectal Surgery  [address]  [place] [address]  Phone: [phone]  Fax: [phone]  Follow Up Time:

## 2023-12-03 NOTE — ED PROVIDER NOTE - RECTAL
thrombosed external hemorrhoid noted, +TTP, mild bleeding noted.  chaperoned by ZEV Solis/ROBERT thrombosed external hemorrhoid noted, +TTP, mild bleeding noted.  chaperoned by ZVE Solis/ROBERT

## 2023-12-03 NOTE — ED PROVIDER NOTE - NSFOLLOWUPINSTRUCTIONS_ED_ALL_ED_FT
You were seen in the emergency department for a thrombosed hemorrhoid that was excised by the surgical team.  Please perform sitz bath's after every bowel movement.  Please hold your Xarelto for 48 hours.  Continue your isaak softeners   Can take Tylenol up to 1000 mg every 8 hours as needed for pain.  If you are having breakthrough pain oxycodone 5 mg every 6 hours can be taken.  Please follow-up with Dr. Clarisa Murguia with information provided to you above.  Call to make an appointment within 1 week.  Please return to the emergency department for any severe pain, uncontrolled bleeding, fevers or any other new or concerning symptoms.      Return to the ER for worsening or persistent symptoms, including but not limited to fevers, dark black stools, inability to pass stools, and/or ANY NEW OR CONCERNING SYMPTOMS. If you have issues obtaining follow up, please call: [phone] to obtain a doctor or specialist who takes your insurance in your area. You were seen in the emergency department for a thrombosed hemorrhoid that was excised by the surgical team.  Please perform sitz bath's after every bowel movement.  Please hold your Xarelto for 48 hours.  Continue your isaak softeners   Can take Tylenol up to 1000 mg every 8 hours as needed for pain.  If you are having breakthrough pain oxycodone 5 mg every 6 hours can be taken.  Please follow-up with Dr. Clarisa Murguia with information provided to you above.  Call to make an appointment within 1 week.  Please return to the emergency department for any severe pain, uncontrolled bleeding, fevers or any other new or concerning symptoms.      Return to the ER for worsening or persistent symptoms, including but not limited to fevers, dark black stools, inability to pass stools, and/or ANY NEW OR CONCERNING SYMPTOMS. If you have issues obtaining follow up, please call: [phone] to obtain a doctor or specialist who takes your insurance in your area.

## 2023-12-03 NOTE — ED ADULT NURSE NOTE - OBJECTIVE STATEMENT
patient aaox4. ambulatory. came in with rectal pain x friday 8/10. patient endorses oozing of blood. gauze temporary on site with dried minimal blood drainage. lbm earlier today, hard with constipation.  patient reports hx of afib, ra, right tkr. iv started to left hand #20g. labs drawn and sent. medicated as ordered. plan of care continues.

## 2023-12-03 NOTE — ED ADULT NURSE NOTE - NSFALLUNIVINTERV_ED_ALL_ED
Bed/Stretcher in lowest position, wheels locked, appropriate side rails in place/Call bell, personal items and telephone in reach/Instruct patient to call for assistance before getting out of bed/chair/stretcher/Non-slip footwear applied when patient is off stretcher/[place] to call system/Physically safe environment - no spills, clutter or unnecessary equipment/Purposeful proactive rounding/Room/bathroom lighting operational, light cord in reach Bed/Stretcher in lowest position, wheels locked, appropriate side rails in place/Call bell, personal items and telephone in reach/Instruct patient to call for assistance before getting out of bed/chair/stretcher/Non-slip footwear applied when patient is off stretcher/[place] to call system/Physically safe environment - no spills, clutter or unnecessary equipment/Purposeful proactive rounding/Room/bathroom lighting operational, light cord in reach

## 2023-12-03 NOTE — ED PROVIDER NOTE - OBJECTIVE STATEMENT
52 yo female c pmhx afib on xarelto, RA, GERD presents to ED c/o external hemorrhoids and bleeding x 2 days.  Pt states has been constipated and states felt firm external hemorrhoid that she couldn't "push back in" and went to urgent care who referred to ED.  Pt denies any dizziness, cp, sob, abd pain, fever, vomiting.

## 2023-12-03 NOTE — ED PROVIDER NOTE - PATIENT PORTAL LINK FT
You can access the FollowMyHealth Patient Portal offered by [facility] by registering at the following website: [email or web address]. By joining [employer]’s FollowMyHealth portal, you will also be able to view your health information using other applications (apps) compatible with our system. You can access the FollowMyHealth Patient Portal offered by [facility] by registering at the following website: [email or web address]. By joining [employer]’s FollowMyHealth portal, you will also be able to view your health information using other applications (apps) compatible with our system.

## 2023-12-03 NOTE — ED ADULT TRIAGE NOTE - CHIEF COMPLAINT QUOTE
hemorrhoid "came out" on Friday- urgent care unable to provide relief- pain with defecation- hx AFib with xarelto

## 2023-12-04 ENCOUNTER — NON-APPOINTMENT (OUTPATIENT)
Age: 53
End: 2023-12-04

## 2023-12-04 LAB
ALBUMIN SERPL ELPH-MCNC: 3.8 G/DL — SIGNIFICANT CHANGE UP (ref 3.3–5)
ALBUMIN SERPL ELPH-MCNC: 3.8 G/DL — SIGNIFICANT CHANGE UP (ref 3.3–5)
ALP SERPL-CCNC: 67 U/L — SIGNIFICANT CHANGE UP (ref 40–120)
ALP SERPL-CCNC: 67 U/L — SIGNIFICANT CHANGE UP (ref 40–120)
ALT FLD-CCNC: 19 U/L — SIGNIFICANT CHANGE UP (ref 4–33)
ALT FLD-CCNC: 19 U/L — SIGNIFICANT CHANGE UP (ref 4–33)
ANION GAP SERPL CALC-SCNC: 10 MMOL/L — SIGNIFICANT CHANGE UP (ref 7–14)
ANION GAP SERPL CALC-SCNC: 10 MMOL/L — SIGNIFICANT CHANGE UP (ref 7–14)
AST SERPL-CCNC: 17 U/L — SIGNIFICANT CHANGE UP (ref 4–32)
AST SERPL-CCNC: 17 U/L — SIGNIFICANT CHANGE UP (ref 4–32)
BASOPHILS # BLD AUTO: 0.04 K/UL — SIGNIFICANT CHANGE UP (ref 0–0.2)
BASOPHILS # BLD AUTO: 0.04 K/UL — SIGNIFICANT CHANGE UP (ref 0–0.2)
BASOPHILS NFR BLD AUTO: 0.8 % — SIGNIFICANT CHANGE UP (ref 0–2)
BASOPHILS NFR BLD AUTO: 0.8 % — SIGNIFICANT CHANGE UP (ref 0–2)
BILIRUB SERPL-MCNC: 0.4 MG/DL — SIGNIFICANT CHANGE UP (ref 0.2–1.2)
BILIRUB SERPL-MCNC: 0.4 MG/DL — SIGNIFICANT CHANGE UP (ref 0.2–1.2)
BUN SERPL-MCNC: 21 MG/DL — SIGNIFICANT CHANGE UP (ref 7–23)
BUN SERPL-MCNC: 21 MG/DL — SIGNIFICANT CHANGE UP (ref 7–23)
CALCIUM SERPL-MCNC: 9.7 MG/DL — SIGNIFICANT CHANGE UP (ref 8.4–10.5)
CALCIUM SERPL-MCNC: 9.7 MG/DL — SIGNIFICANT CHANGE UP (ref 8.4–10.5)
CHLORIDE SERPL-SCNC: 109 MMOL/L — HIGH (ref 98–107)
CHLORIDE SERPL-SCNC: 109 MMOL/L — HIGH (ref 98–107)
CO2 SERPL-SCNC: 23 MMOL/L — SIGNIFICANT CHANGE UP (ref 22–31)
CO2 SERPL-SCNC: 23 MMOL/L — SIGNIFICANT CHANGE UP (ref 22–31)
CREAT SERPL-MCNC: 0.77 MG/DL — SIGNIFICANT CHANGE UP (ref 0.5–1.3)
CREAT SERPL-MCNC: 0.77 MG/DL — SIGNIFICANT CHANGE UP (ref 0.5–1.3)
EGFR: 91 ML/MIN/1.73M2 — SIGNIFICANT CHANGE UP
EGFR: 91 ML/MIN/1.73M2 — SIGNIFICANT CHANGE UP
EOSINOPHIL # BLD AUTO: 0.11 K/UL — SIGNIFICANT CHANGE UP (ref 0–0.5)
EOSINOPHIL # BLD AUTO: 0.11 K/UL — SIGNIFICANT CHANGE UP (ref 0–0.5)
EOSINOPHIL NFR BLD AUTO: 2.1 % — SIGNIFICANT CHANGE UP (ref 0–6)
EOSINOPHIL NFR BLD AUTO: 2.1 % — SIGNIFICANT CHANGE UP (ref 0–6)
GLUCOSE SERPL-MCNC: 103 MG/DL — HIGH (ref 70–99)
GLUCOSE SERPL-MCNC: 103 MG/DL — HIGH (ref 70–99)
HCT VFR BLD CALC: 43.3 % — SIGNIFICANT CHANGE UP (ref 34.5–45)
HCT VFR BLD CALC: 43.3 % — SIGNIFICANT CHANGE UP (ref 34.5–45)
HGB BLD-MCNC: 13.9 G/DL — SIGNIFICANT CHANGE UP (ref 11.5–15.5)
HGB BLD-MCNC: 13.9 G/DL — SIGNIFICANT CHANGE UP (ref 11.5–15.5)
IANC: 2.49 K/UL — SIGNIFICANT CHANGE UP (ref 1.8–7.4)
IANC: 2.49 K/UL — SIGNIFICANT CHANGE UP (ref 1.8–7.4)
IMM GRANULOCYTES NFR BLD AUTO: 0 % — SIGNIFICANT CHANGE UP (ref 0–0.9)
IMM GRANULOCYTES NFR BLD AUTO: 0 % — SIGNIFICANT CHANGE UP (ref 0–0.9)
LYMPHOCYTES # BLD AUTO: 2.02 K/UL — SIGNIFICANT CHANGE UP (ref 1–3.3)
LYMPHOCYTES # BLD AUTO: 2.02 K/UL — SIGNIFICANT CHANGE UP (ref 1–3.3)
LYMPHOCYTES # BLD AUTO: 39.1 % — SIGNIFICANT CHANGE UP (ref 13–44)
LYMPHOCYTES # BLD AUTO: 39.1 % — SIGNIFICANT CHANGE UP (ref 13–44)
MCHC RBC-ENTMCNC: 29.8 PG — SIGNIFICANT CHANGE UP (ref 27–34)
MCHC RBC-ENTMCNC: 29.8 PG — SIGNIFICANT CHANGE UP (ref 27–34)
MCHC RBC-ENTMCNC: 32.1 GM/DL — SIGNIFICANT CHANGE UP (ref 32–36)
MCHC RBC-ENTMCNC: 32.1 GM/DL — SIGNIFICANT CHANGE UP (ref 32–36)
MCV RBC AUTO: 92.7 FL — SIGNIFICANT CHANGE UP (ref 80–100)
MCV RBC AUTO: 92.7 FL — SIGNIFICANT CHANGE UP (ref 80–100)
MONOCYTES # BLD AUTO: 0.51 K/UL — SIGNIFICANT CHANGE UP (ref 0–0.9)
MONOCYTES # BLD AUTO: 0.51 K/UL — SIGNIFICANT CHANGE UP (ref 0–0.9)
MONOCYTES NFR BLD AUTO: 9.9 % — SIGNIFICANT CHANGE UP (ref 2–14)
MONOCYTES NFR BLD AUTO: 9.9 % — SIGNIFICANT CHANGE UP (ref 2–14)
NEUTROPHILS # BLD AUTO: 2.49 K/UL — SIGNIFICANT CHANGE UP (ref 1.8–7.4)
NEUTROPHILS # BLD AUTO: 2.49 K/UL — SIGNIFICANT CHANGE UP (ref 1.8–7.4)
NEUTROPHILS NFR BLD AUTO: 48.1 % — SIGNIFICANT CHANGE UP (ref 43–77)
NEUTROPHILS NFR BLD AUTO: 48.1 % — SIGNIFICANT CHANGE UP (ref 43–77)
NRBC # BLD: 0 /100 WBCS — SIGNIFICANT CHANGE UP (ref 0–0)
NRBC # BLD: 0 /100 WBCS — SIGNIFICANT CHANGE UP (ref 0–0)
NRBC # FLD: 0 K/UL — SIGNIFICANT CHANGE UP (ref 0–0)
NRBC # FLD: 0 K/UL — SIGNIFICANT CHANGE UP (ref 0–0)
PLATELET # BLD AUTO: 117 K/UL — LOW (ref 150–400)
PLATELET # BLD AUTO: 117 K/UL — LOW (ref 150–400)
POTASSIUM SERPL-MCNC: 4.2 MMOL/L — SIGNIFICANT CHANGE UP (ref 3.5–5.3)
POTASSIUM SERPL-MCNC: 4.2 MMOL/L — SIGNIFICANT CHANGE UP (ref 3.5–5.3)
POTASSIUM SERPL-SCNC: 4.2 MMOL/L — SIGNIFICANT CHANGE UP (ref 3.5–5.3)
POTASSIUM SERPL-SCNC: 4.2 MMOL/L — SIGNIFICANT CHANGE UP (ref 3.5–5.3)
PROT SERPL-MCNC: 6.9 G/DL — SIGNIFICANT CHANGE UP (ref 6–8.3)
PROT SERPL-MCNC: 6.9 G/DL — SIGNIFICANT CHANGE UP (ref 6–8.3)
RBC # BLD: 4.67 M/UL — SIGNIFICANT CHANGE UP (ref 3.8–5.2)
RBC # BLD: 4.67 M/UL — SIGNIFICANT CHANGE UP (ref 3.8–5.2)
RBC # FLD: 14.8 % — HIGH (ref 10.3–14.5)
RBC # FLD: 14.8 % — HIGH (ref 10.3–14.5)
SODIUM SERPL-SCNC: 142 MMOL/L — SIGNIFICANT CHANGE UP (ref 135–145)
SODIUM SERPL-SCNC: 142 MMOL/L — SIGNIFICANT CHANGE UP (ref 135–145)
WBC # BLD: 5.17 K/UL — SIGNIFICANT CHANGE UP (ref 3.8–10.5)
WBC # BLD: 5.17 K/UL — SIGNIFICANT CHANGE UP (ref 3.8–10.5)
WBC # FLD AUTO: 5.17 K/UL — SIGNIFICANT CHANGE UP (ref 3.8–10.5)
WBC # FLD AUTO: 5.17 K/UL — SIGNIFICANT CHANGE UP (ref 3.8–10.5)

## 2023-12-04 PROCEDURE — 99285 EMERGENCY DEPT VISIT HI MDM: CPT

## 2023-12-04 RX ORDER — LIDOCAINE HYDROCHLORIDE AND EPINEPHRINE 10; 10 MG/ML; UG/ML
10 INJECTION, SOLUTION INFILTRATION; PERINEURAL ONCE
Refills: 0 | Status: DISCONTINUED | OUTPATIENT
Start: 2023-12-04 | End: 2023-12-07

## 2023-12-04 RX ORDER — OXYCODONE HYDROCHLORIDE 5 MG/1
5 TABLET ORAL ONCE
Refills: 0 | Status: DISCONTINUED | OUTPATIENT
Start: 2023-12-04 | End: 2023-12-04

## 2023-12-04 RX ORDER — OXYCODONE HYDROCHLORIDE 5 MG/1
1 TABLET ORAL
Qty: 5 | Refills: 0
Start: 2023-12-04 | End: 2023-12-05

## 2023-12-04 RX ADMIN — OXYCODONE HYDROCHLORIDE 5 MILLIGRAM(S): 5 TABLET ORAL at 01:18

## 2023-12-04 NOTE — CONSULT NOTE ADULT - SUBJECTIVE AND OBJECTIVE BOX
53 F PMH Afib on Xarelto 20mg QHS, RA, internal hemorrhoids managed by GI who p/w 1d pain in rectum and bleeding after using the bathroom.    In the ED patient HDS. Labs unremarkable. Patient maintains she has had hemorrhoids in the past managed solely by Miralax, however this time she noticed a great deal of pain in her rectum. She also endorses she felt something from below that could not be reduced whereas before she was always able to reduce her hemorrhoids She denies pregnancy. Denies leakage, fevers, chills.    PMH: As above    PSH: As above    Allergies: Sulfa    Physical exam:    /87 HR 64 SPO2 98 T 98.5 RR 18    General- Woman in mild distress. Non toxic appearing  Lungs- Breathing comfortably on room air  - Firm mass exuding from R side of rectum with bluish hue, inferior to this internal hemorrhoid. No active bleeding. No fistula or induration or swelling noted.    Imaging:    N/A 53 F PMH Afib on Xarelto 20mg QHS, RA, internal hemorrhoids managed by GI who p/w 3d pain in rectum and bleeding after using the bathroom.    In the ED patient HDS. Labs unremarkable. Patient maintains she has had hemorrhoids in the past managed solely by Miralax, however this time she noticed a great deal of pain in her rectum. She also endorses she felt something from below that could not be reduced whereas before she was always able to reduce her hemorrhoids. She denies pregnancy. Denies leakage, fevers, chills. Last dose Xarelto yesterday.    PMH: As above    PSH: As above    Allergies: Sulfa    Physical exam:    /87 HR 64 SPO2 98 T 98.5 RR 18    General- Woman in mild distress. Non toxic appearing  Lungs- Breathing comfortably on room air  - Firm mass exuding from R side of rectum with bluish hue, inferior to this internal hemorrhoid. No active bleeding. No fistula, induration or swelling noted. No pus. RAUDEL deferred.    Imaging:    N/A

## 2023-12-04 NOTE — PROCEDURE NOTE - ADDITIONAL PROCEDURE DETAILS
20cc lidocaine 1% w/ epi administered, external hemorrhoid lanced succesfully with elipse of skin. Hemostasis obtained w/ silver nitrate stick and cautery. 20cc lidocaine 1% w/ epi administered, external hemorrhoid lanced succesfully with elipse of skin. Large clot evacuated bluntly and with forceps. Hemostasis obtained w/ silver nitrate stick and cautery.

## 2023-12-04 NOTE — CONSULT NOTE ADULT - ASSESSMENT
53 F p/w thrombosed external hemorrhoid and Grade 3 internal hemorrhoid.     -Lancing of external hemorrhoid done at bedside  -See separate procedure note  -Recommend strong pain medications to go home with given morbidity of hemorrhoids  -Followup with Dr. Whitley in clinic for possible banding/excisional hemorrhoidectomy    D/w Chief Resident on call    Reza Cunha MD  PGY-3  A Team 53 F p/w thrombosed external hemorrhoid and Grade 3 internal hemorrhoid.     -Lancing of external hemorrhoid done at bedside  -See separate procedure note  -Recommend strong pain medications to go home with given morbidity of hemorrhoids  -Sitz Bath BID or after every bowl movement, Witch Hazel BID  -Xarelto resume 48 hours after procedure  -Followup with Dr. Whitley in clinic ASAP for possible banding/excisional hemorrhoidectomy of internal hemorrhoid    D/w Chief Resident on call    Reza Cunha MD  PGY-3  A Team 53 F p/w thrombosed external hemorrhoid and Grade 3 internal hemorrhoid.     -Lancing of external hemorrhoid successfully done at bedside  -See separate procedure note  -Recommend strong pain medications to go home with given morbidity of hemorrhoids  -Sitz Bath BID or after every bowl movement and Witch Hazel BID  -Xarelto to resume in 48 hours  -Disposable underwear and supplies distributed to patient and family  -Followup with Dr. Whitley in clinic ASAP for possible banding/excisional hemorrhoidectomy of internal hemorrhoid    D/w Chief Resident on call    Reza Cunha MD  PGY-3  A Team

## 2023-12-05 ENCOUNTER — TRANSCRIPTION ENCOUNTER (OUTPATIENT)
Age: 53
End: 2023-12-05

## 2023-12-06 ENCOUNTER — APPOINTMENT (OUTPATIENT)
Dept: RHEUMATOLOGY | Facility: CLINIC | Age: 53
End: 2023-12-06
Payer: COMMERCIAL

## 2023-12-06 ENCOUNTER — APPOINTMENT (OUTPATIENT)
Dept: COLORECTAL SURGERY | Facility: CLINIC | Age: 53
End: 2023-12-06
Payer: COMMERCIAL

## 2023-12-06 VITALS
SYSTOLIC BLOOD PRESSURE: 138 MMHG | TEMPERATURE: 97.1 F | OXYGEN SATURATION: 98 % | WEIGHT: 250 LBS | HEART RATE: 72 BPM | HEIGHT: 68 IN | BODY MASS INDEX: 37.89 KG/M2 | RESPIRATION RATE: 16 BRPM | DIASTOLIC BLOOD PRESSURE: 88 MMHG

## 2023-12-06 VITALS
DIASTOLIC BLOOD PRESSURE: 88 MMHG | OXYGEN SATURATION: 98 % | SYSTOLIC BLOOD PRESSURE: 138 MMHG | RESPIRATION RATE: 16 BRPM | HEIGHT: 68 IN | HEART RATE: 72 BPM | TEMPERATURE: 98 F | BODY MASS INDEX: 37.89 KG/M2 | WEIGHT: 250 LBS

## 2023-12-06 DIAGNOSIS — M06.9 RHEUMATOID ARTHRITIS, UNSPECIFIED: ICD-10-CM

## 2023-12-06 PROCEDURE — 99214 OFFICE O/P EST MOD 30 MIN: CPT

## 2023-12-06 PROCEDURE — 99213 OFFICE O/P EST LOW 20 MIN: CPT

## 2023-12-06 RX ORDER — HYDROCORTISONE 25 MG/G
2.5 CREAM TOPICAL
Qty: 30 | Refills: 0 | Status: DISCONTINUED | COMMUNITY
Start: 2020-06-15 | End: 2023-12-06

## 2023-12-06 RX ORDER — AZELASTINE HYDROCHLORIDE 137 UG/1
0.1 SPRAY, METERED NASAL
Qty: 1 | Refills: 1 | Status: DISCONTINUED | COMMUNITY
Start: 2021-07-09 | End: 2023-12-06

## 2023-12-06 RX ORDER — FLUTICASONE PROPIONATE AND SALMETEROL 50; 250 UG/1; UG/1
250-50 POWDER RESPIRATORY (INHALATION) TWICE DAILY
Qty: 1 | Refills: 1 | Status: DISCONTINUED | COMMUNITY
Start: 2021-04-13 | End: 2023-12-06

## 2023-12-06 RX ORDER — HYDROCORTISONE ACETATE, PRAMOXINE HCL 2.5; 1 G/100G; G/100G
2.5-1 CREAM TOPICAL
Qty: 2 | Refills: 5 | Status: DISCONTINUED | COMMUNITY
Start: 2020-12-22 | End: 2023-12-06

## 2023-12-07 ENCOUNTER — RX CHANGE (OUTPATIENT)
Age: 53
End: 2023-12-07

## 2023-12-07 ENCOUNTER — OUTPATIENT (OUTPATIENT)
Dept: OUTPATIENT SERVICES | Facility: HOSPITAL | Age: 53
LOS: 1 days | End: 2023-12-07
Payer: COMMERCIAL

## 2023-12-07 ENCOUNTER — TRANSCRIPTION ENCOUNTER (OUTPATIENT)
Age: 53
End: 2023-12-07

## 2023-12-07 ENCOUNTER — NON-APPOINTMENT (OUTPATIENT)
Age: 53
End: 2023-12-07

## 2023-12-07 VITALS — RESPIRATION RATE: 14 BRPM | HEIGHT: 65 IN | WEIGHT: 257.94 LBS

## 2023-12-07 DIAGNOSIS — K64.9 UNSPECIFIED HEMORRHOIDS: ICD-10-CM

## 2023-12-07 DIAGNOSIS — Z01.818 ENCOUNTER FOR OTHER PREPROCEDURAL EXAMINATION: ICD-10-CM

## 2023-12-07 PROCEDURE — G0463: CPT

## 2023-12-07 RX ORDER — SODIUM CHLORIDE 9 MG/ML
1000 INJECTION, SOLUTION INTRAVENOUS
Refills: 0 | Status: DISCONTINUED | OUTPATIENT
Start: 2023-12-08 | End: 2023-12-22

## 2023-12-07 RX ORDER — SODIUM CHLORIDE 9 MG/ML
3 INJECTION INTRAMUSCULAR; INTRAVENOUS; SUBCUTANEOUS EVERY 8 HOURS
Refills: 0 | Status: DISCONTINUED | OUTPATIENT
Start: 2023-12-08 | End: 2023-12-22

## 2023-12-07 RX ORDER — LIDOCAINE HCL 20 MG/ML
0.2 VIAL (ML) INJECTION ONCE
Refills: 0 | Status: DISCONTINUED | OUTPATIENT
Start: 2023-12-08 | End: 2023-12-22

## 2023-12-07 NOTE — H&P PST ADULT - NSICDXPASTSURGICALHX_GEN_ALL_CORE_FT
PAST SURGICAL HISTORY:  H/O colonoscopy     History of left knee replacement     S/P tubal ligation 2007

## 2023-12-07 NOTE — H&P PST ADULT - BIRTH SEX

## 2023-12-07 NOTE — H&P PST ADULT - PATIENT'S PREFERRED PRONOUN
Her/She Ilumya Counseling: I discussed with the patient the risks of tildrakizumab including but not limited to immunosuppression, malignancy, posterior leukoencephalopathy syndrome, and serious infections.  The patient understands that monitoring is required including a PPD at baseline and must alert us or the primary physician if symptoms of infection or other concerning signs are noted.

## 2023-12-07 NOTE — H&P PST ADULT - PROBLEM SELECTOR PLAN 1
Hemorrhoidectomy  -cbc, cmp done 12/3-results on chart and available in [facility]  -medical evaluation pending  -preop instructions provided  -patient states that she was instructed by surgeon to hold xarelto after 12/6 dose

## 2023-12-07 NOTE — H&P PST ADULT - NSICDXPASTMEDICALHX_GEN_ALL_CORE_FT
PAST MEDICAL HISTORY:  Atrial fibrillation     DDD (degenerative disc disease), cervical     GERD (gastroesophageal reflux disease)     History of ITP     HTN (hypertension)     OA (osteoarthritis)     Obesity     RA (rheumatoid arthritis)     Uterine leiomyoma

## 2023-12-07 NOTE — H&P PST ADULT - HISTORY OF PRESENT ILLNESS
53 year old female with PMH of Atrial Fibrillation on Xarelto, RA, ITP, Morbid Obesity (BMI 43), OA, S/P right TKA in 2017, GERD, Hemorrhoids, who presents to [facility] ED on 12/3 with complaint of external hemorrhoids and rectal bleeding x 2 days.  Patient underwent external thrombosed hemorrhoid lancing in ED. She reports rectal bleeding after using the bathroom and pain  with defecation, sitting and bending. She states that restarted the Xarelto on 12/6 and to hold starting 12/7. She denies fever, chills, SOB, ISRA[place], chest pain pa         53 year old female with PMH of Atrial Fibrillation on Xarelto, RA, ITP, Morbid Obesity (BMI 43), OA, S/P right TKA in 2017, GERD, Hemorrhoids, who presents to [facility] ED on 12/3 with complaint of external hemorrhoids and rectal bleeding x 2 days.  Patient underwent external thrombosed hemorrhoid lancing in ED. She reports rectal bleeding after using the bathroom and pain  with defecation, sitting and bending. She states that restarted the Xarelto on 12/6 and to hold starting 12/7. She denies fever, chills, SOB, [place]RA[place], chest pain pa         53 year old female with PMH of Atrial Fibrillation on Xarelto, RA, ITP, Morbid Obesity (BMI 43), OA, S/P right TKA in 2017, GERD, Hemorrhoids, who presents to [facility] ED on 12/3 with complaint of external hemorrhoids and rectal bleeding x 2 days.  Patient underwent external thrombosed hemorrhoid lancing in ED on 12/4. She reports rectal bleeding after using the bathroom and pain with defecation, sitting and bending. She states that  Xarelto was held for 48 hours after I&D and resumed on 12/6. She reports instructed by surgeon to hold again starting 12/7. She denies fever, chills, SOB, ISRAEL, chest pain palpitation, dizziness, lightheadedness, or syncope         53 year old female with PMH of Atrial Fibrillation on Xarelto, RA, ITP, Morbid Obesity (BMI 43), OA, S/P right TKA in 2017, GERD, Hemorrhoids, who presents to [facility] ED on 12/3 with complaint of external hemorrhoids and rectal bleeding x 2 days.  Patient underwent external thrombosed hemorrhoid lancing in ED on 12/4. She reports rectal bleeding after using the bathroom and pain with defecation, sitting and bending. She states that  Xarelto was held for 48 hours after I&D and resumed on 12/6. She reports instructed by surgeon to hold again starting 12/7. She denies fever, chills, SOB, ISRAEL, chest pain palpitation, dizziness, lightheadedness, or syncope

## 2023-12-07 NOTE — H&P PST ADULT - BLOOD AVOIDANCE/RESTRICTIONS, PROFILE
none Repair Performed By Another Provider Text (Leave Blank If You Do Not Want): After obtaining clear surgical margins the defect was repaired by another provider.

## 2023-12-08 ENCOUNTER — RESULT REVIEW (OUTPATIENT)
Age: 53
End: 2023-12-08

## 2023-12-08 ENCOUNTER — OUTPATIENT (OUTPATIENT)
Dept: OUTPATIENT SERVICES | Facility: HOSPITAL | Age: 53
LOS: 1 days | End: 2023-12-08
Payer: COMMERCIAL

## 2023-12-08 ENCOUNTER — TRANSCRIPTION ENCOUNTER (OUTPATIENT)
Age: 53
End: 2023-12-08

## 2023-12-08 ENCOUNTER — APPOINTMENT (OUTPATIENT)
Dept: COLORECTAL SURGERY | Facility: HOSPITAL | Age: 53
End: 2023-12-08

## 2023-12-08 VITALS
HEART RATE: 64 BPM | DIASTOLIC BLOOD PRESSURE: 90 MMHG | RESPIRATION RATE: 19 BRPM | SYSTOLIC BLOOD PRESSURE: 136 MMHG | OXYGEN SATURATION: 100 %

## 2023-12-08 DIAGNOSIS — Z98.890 OTHER SPECIFIED POSTPROCEDURAL STATES: Chronic | ICD-10-CM

## 2023-12-08 DIAGNOSIS — K64.9 UNSPECIFIED HEMORRHOIDS: ICD-10-CM

## 2023-12-08 DIAGNOSIS — Z98.51 TUBAL LIGATION STATUS: Chronic | ICD-10-CM

## 2023-12-08 DIAGNOSIS — Z96.652 PRESENCE OF LEFT ARTIFICIAL KNEE JOINT: Chronic | ICD-10-CM

## 2023-12-08 PROCEDURE — 88360 TUMOR IMMUNOHISTOCHEM/MANUAL: CPT

## 2023-12-08 PROCEDURE — 88304 TISSUE EXAM BY PATHOLOGIST: CPT | Mod: 26

## 2023-12-08 PROCEDURE — 46255 REMOVE INT/EXT HEM 1 GROUP: CPT

## 2023-12-08 PROCEDURE — C1889: CPT

## 2023-12-08 PROCEDURE — 88304 TISSUE EXAM BY PATHOLOGIST: CPT

## 2023-12-08 PROCEDURE — 88342 IMHCHEM/IMCYTCHM 1ST ANTB: CPT | Mod: 26,59

## 2023-12-08 PROCEDURE — 88341 IMHCHEM/IMCYTCHM EA ADD ANTB: CPT

## 2023-12-08 PROCEDURE — 88341 IMHCHEM/IMCYTCHM EA ADD ANTB: CPT | Mod: 26,59

## 2023-12-08 DEVICE — SURGICEL FIBRILLAR 2 X 4": Type: IMPLANTABLE DEVICE | Status: FUNCTIONAL

## 2023-12-08 RX ORDER — ACETAMINOPHEN 500 MG
2 TABLET ORAL
Qty: 0 | Refills: 0 | DISCHARGE
Start: 2023-12-08

## 2023-12-08 RX ORDER — OXYCODONE HYDROCHLORIDE 5 MG/1
5 TABLET ORAL ONCE
Refills: 0 | Status: DISCONTINUED | OUTPATIENT
Start: 2023-12-08 | End: 2023-12-08

## 2023-12-08 RX ORDER — OXYCODONE HYDROCHLORIDE 5 MG/1
1 TABLET ORAL
Qty: 0 | Refills: 0 | DISCHARGE
Start: 2023-12-08

## 2023-12-08 RX ORDER — IBUPROFEN 200 MG
1 TABLET ORAL
Qty: 28 | Refills: 0
Start: 2023-12-08 | End: 2023-12-14

## 2023-12-08 RX ORDER — ACETAMINOPHEN 500 MG
650 TABLET ORAL ONCE
Refills: 0 | Status: DISCONTINUED | OUTPATIENT
Start: 2023-12-08 | End: 2023-12-22

## 2023-12-08 RX ORDER — OXYCODONE HYDROCHLORIDE 5 MG/1
1 TABLET ORAL
Qty: 12 | Refills: 0
Start: 2023-12-08

## 2023-12-08 RX ADMIN — OXYCODONE HYDROCHLORIDE 5 MILLIGRAM(S): 5 TABLET ORAL at 14:48

## 2023-12-08 NOTE — ASU DISCHARGE PLAN (ADULT/PEDIATRIC) - ASU DC SPECIAL INSTRUCTIONSFT
Please follow-up with Dr. Whitley in 2-3weeks  Please see discharge paper work for additional instructions    DO NOT TAKE YOUR XARELTO.   There is an increased risk of bleeding at the incision site if you take xarelto  Can restart Xarelto on Thursday December 14, 2023

## 2023-12-08 NOTE — ASU DISCHARGE PLAN (ADULT/PEDIATRIC) - NS MD DC FALL RISK RISK
For information on Fall & Injury Prevention, visit: [email or web address] OR  [email or web address] OR  https://www.cdc.gov/steadi/patient.html For information on Fall & Injury Prevention, visit: [email or web address] OR  [email or web address] OR  https://www.cdc.gov/steadi/patient.html

## 2023-12-08 NOTE — ASU DISCHARGE PLAN (ADULT/PEDIATRIC) - CARE PROVIDER_API CALL
Joby Whitley  Colon/Rectal Surgery  [address], [address]  [place] [address]  Phone: [phone]  Fax: [phone]  Established Patient  Follow Up Time:    Joby Whitley  Colon/Rectal Surgery  [address], [address]  [place] [address]  Phone: [phone]  Fax: [phone]  Established Patient  Follow Up Time:

## 2023-12-08 NOTE — ASU PATIENT PROFILE, ADULT - FALL HARM RISK - UNIVERSAL INTERVENTIONS
Bed in lowest position, wheels locked, appropriate side rails in place/Call bell, personal items and telephone in reach/Instruct patient to call for assistance before getting out of bed or chair/Non-slip footwear when patient is out of bed/[place] to call system/Physically safe environment - no spills, clutter or unnecessary equipment/Purposeful Proactive Rounding/Room/bathroom lighting operational, light cord in reach Bed in lowest position, wheels locked, appropriate side rails in place/Call bell, personal items and telephone in reach/Instruct patient to call for assistance before getting out of bed or chair/Non-slip footwear when patient is out of bed/[place] to call system/Physically safe environment - no spills, clutter or unnecessary equipment/Purposeful Proactive Rounding/Room/bathroom lighting operational, light cord in reach

## 2023-12-08 NOTE — ASU DISCHARGE PLAN (ADULT/PEDIATRIC) - PROVIDER TOKENS
PROVIDER:[TOKEN:[[id number]],ESTABLISHEDPATIENT:[T]] PROVIDER:[TOKEN:[[id number]],ESTABLISHEDPATIENT:[T]]

## 2023-12-08 NOTE — PACU DISCHARGE NOTE - AIRWAY PATENCY:
PRINCIPAL DISCHARGE DIAGNOSIS  Diagnosis: Facial aging  Assessment and Plan of Treatment:
Satisfactory

## 2023-12-15 LAB
SURGICAL PATHOLOGY STUDY: SIGNIFICANT CHANGE UP
SURGICAL PATHOLOGY STUDY: SIGNIFICANT CHANGE UP

## 2023-12-22 ENCOUNTER — RESULT REVIEW (OUTPATIENT)
Age: 53
End: 2023-12-22

## 2023-12-22 ENCOUNTER — INPATIENT (INPATIENT)
Facility: HOSPITAL | Age: 53
LOS: 0 days | Discharge: HOME | End: 2023-12-23
Attending: STUDENT IN AN ORGANIZED HEALTH CARE EDUCATION/TRAINING PROGRAM | Admitting: STUDENT IN AN ORGANIZED HEALTH CARE EDUCATION/TRAINING PROGRAM
Payer: COMMERCIAL

## 2023-12-22 VITALS
RESPIRATION RATE: 20 BRPM | TEMPERATURE: 98 F | OXYGEN SATURATION: 99 % | DIASTOLIC BLOOD PRESSURE: 81 MMHG | HEART RATE: 72 BPM | HEIGHT: 65 IN | SYSTOLIC BLOOD PRESSURE: 134 MMHG

## 2023-12-22 DIAGNOSIS — Z98.890 OTHER SPECIFIED POSTPROCEDURAL STATES: Chronic | ICD-10-CM

## 2023-12-22 DIAGNOSIS — D68.59 OTHER PRIMARY THROMBOPHILIA: ICD-10-CM

## 2023-12-22 DIAGNOSIS — M06.9 RHEUMATOID ARTHRITIS, UNSPECIFIED: ICD-10-CM

## 2023-12-22 DIAGNOSIS — I85.00 ESOPHAGEAL VARICES WITHOUT BLEEDING: ICD-10-CM

## 2023-12-22 DIAGNOSIS — K92.2 GASTROINTESTINAL HEMORRHAGE, UNSPECIFIED: ICD-10-CM

## 2023-12-22 DIAGNOSIS — Z98.890 OTHER SPECIFIED POSTPROCEDURAL STATES: ICD-10-CM

## 2023-12-22 DIAGNOSIS — I48.91 UNSPECIFIED ATRIAL FIBRILLATION: ICD-10-CM

## 2023-12-22 DIAGNOSIS — Z98.51 TUBAL LIGATION STATUS: Chronic | ICD-10-CM

## 2023-12-22 DIAGNOSIS — Z96.652 PRESENCE OF LEFT ARTIFICIAL KNEE JOINT: Chronic | ICD-10-CM

## 2023-12-22 PROBLEM — I10 ESSENTIAL (PRIMARY) HYPERTENSION: Chronic | Status: ACTIVE | Noted: 2023-12-07

## 2023-12-22 LAB
ALBUMIN SERPL ELPH-MCNC: 3.8 G/DL — SIGNIFICANT CHANGE UP (ref 3.3–5)
ALBUMIN SERPL ELPH-MCNC: 3.8 G/DL — SIGNIFICANT CHANGE UP (ref 3.3–5)
ALP SERPL-CCNC: 76 U/L — SIGNIFICANT CHANGE UP (ref 40–120)
ALP SERPL-CCNC: 76 U/L — SIGNIFICANT CHANGE UP (ref 40–120)
ALT FLD-CCNC: 32 U/L — SIGNIFICANT CHANGE UP (ref 4–33)
ALT FLD-CCNC: 32 U/L — SIGNIFICANT CHANGE UP (ref 4–33)
ANION GAP SERPL CALC-SCNC: 10 MMOL/L — SIGNIFICANT CHANGE UP (ref 7–14)
ANION GAP SERPL CALC-SCNC: 10 MMOL/L — SIGNIFICANT CHANGE UP (ref 7–14)
APTT BLD: 36.3 SEC — HIGH (ref 24.5–35.6)
APTT BLD: 36.3 SEC — HIGH (ref 24.5–35.6)
AST SERPL-CCNC: 19 U/L — SIGNIFICANT CHANGE UP (ref 4–32)
AST SERPL-CCNC: 19 U/L — SIGNIFICANT CHANGE UP (ref 4–32)
BASOPHILS # BLD AUTO: 0.05 K/UL — SIGNIFICANT CHANGE UP (ref 0–0.2)
BASOPHILS # BLD AUTO: 0.05 K/UL — SIGNIFICANT CHANGE UP (ref 0–0.2)
BASOPHILS NFR BLD AUTO: 1 % — SIGNIFICANT CHANGE UP (ref 0–2)
BASOPHILS NFR BLD AUTO: 1 % — SIGNIFICANT CHANGE UP (ref 0–2)
BILIRUB SERPL-MCNC: 0.5 MG/DL — SIGNIFICANT CHANGE UP (ref 0.2–1.2)
BILIRUB SERPL-MCNC: 0.5 MG/DL — SIGNIFICANT CHANGE UP (ref 0.2–1.2)
BUN SERPL-MCNC: 14 MG/DL — SIGNIFICANT CHANGE UP (ref 7–23)
BUN SERPL-MCNC: 14 MG/DL — SIGNIFICANT CHANGE UP (ref 7–23)
CALCIUM SERPL-MCNC: 9.7 MG/DL — SIGNIFICANT CHANGE UP (ref 8.4–10.5)
CALCIUM SERPL-MCNC: 9.7 MG/DL — SIGNIFICANT CHANGE UP (ref 8.4–10.5)
CHLORIDE SERPL-SCNC: 107 MMOL/L — SIGNIFICANT CHANGE UP (ref 98–107)
CHLORIDE SERPL-SCNC: 107 MMOL/L — SIGNIFICANT CHANGE UP (ref 98–107)
CO2 SERPL-SCNC: 24 MMOL/L — SIGNIFICANT CHANGE UP (ref 22–31)
CO2 SERPL-SCNC: 24 MMOL/L — SIGNIFICANT CHANGE UP (ref 22–31)
CREAT SERPL-MCNC: 0.78 MG/DL — SIGNIFICANT CHANGE UP (ref 0.5–1.3)
CREAT SERPL-MCNC: 0.78 MG/DL — SIGNIFICANT CHANGE UP (ref 0.5–1.3)
EGFR: 90 ML/MIN/1.73M2 — SIGNIFICANT CHANGE UP
EGFR: 90 ML/MIN/1.73M2 — SIGNIFICANT CHANGE UP
EOSINOPHIL # BLD AUTO: 0.23 K/UL — SIGNIFICANT CHANGE UP (ref 0–0.5)
EOSINOPHIL # BLD AUTO: 0.23 K/UL — SIGNIFICANT CHANGE UP (ref 0–0.5)
EOSINOPHIL NFR BLD AUTO: 4.8 % — SIGNIFICANT CHANGE UP (ref 0–6)
EOSINOPHIL NFR BLD AUTO: 4.8 % — SIGNIFICANT CHANGE UP (ref 0–6)
GLUCOSE SERPL-MCNC: 78 MG/DL — SIGNIFICANT CHANGE UP (ref 70–99)
GLUCOSE SERPL-MCNC: 78 MG/DL — SIGNIFICANT CHANGE UP (ref 70–99)
HCT VFR BLD CALC: 44.7 % — SIGNIFICANT CHANGE UP (ref 34.5–45)
HCT VFR BLD CALC: 44.7 % — SIGNIFICANT CHANGE UP (ref 34.5–45)
HGB BLD-MCNC: 14.7 G/DL — SIGNIFICANT CHANGE UP (ref 11.5–15.5)
HGB BLD-MCNC: 14.7 G/DL — SIGNIFICANT CHANGE UP (ref 11.5–15.5)
IANC: 2.44 K/UL — SIGNIFICANT CHANGE UP (ref 1.8–7.4)
IANC: 2.44 K/UL — SIGNIFICANT CHANGE UP (ref 1.8–7.4)
IMM GRANULOCYTES NFR BLD AUTO: 0.2 % — SIGNIFICANT CHANGE UP (ref 0–0.9)
IMM GRANULOCYTES NFR BLD AUTO: 0.2 % — SIGNIFICANT CHANGE UP (ref 0–0.9)
INR BLD: 1.38 RATIO — HIGH (ref 0.85–1.18)
INR BLD: 1.38 RATIO — HIGH (ref 0.85–1.18)
LYMPHOCYTES # BLD AUTO: 1.63 K/UL — SIGNIFICANT CHANGE UP (ref 1–3.3)
LYMPHOCYTES # BLD AUTO: 1.63 K/UL — SIGNIFICANT CHANGE UP (ref 1–3.3)
LYMPHOCYTES # BLD AUTO: 33.7 % — SIGNIFICANT CHANGE UP (ref 13–44)
LYMPHOCYTES # BLD AUTO: 33.7 % — SIGNIFICANT CHANGE UP (ref 13–44)
MCHC RBC-ENTMCNC: 29.9 PG — SIGNIFICANT CHANGE UP (ref 27–34)
MCHC RBC-ENTMCNC: 29.9 PG — SIGNIFICANT CHANGE UP (ref 27–34)
MCHC RBC-ENTMCNC: 32.9 GM/DL — SIGNIFICANT CHANGE UP (ref 32–36)
MCHC RBC-ENTMCNC: 32.9 GM/DL — SIGNIFICANT CHANGE UP (ref 32–36)
MCV RBC AUTO: 91 FL — SIGNIFICANT CHANGE UP (ref 80–100)
MCV RBC AUTO: 91 FL — SIGNIFICANT CHANGE UP (ref 80–100)
MONOCYTES # BLD AUTO: 0.47 K/UL — SIGNIFICANT CHANGE UP (ref 0–0.9)
MONOCYTES # BLD AUTO: 0.47 K/UL — SIGNIFICANT CHANGE UP (ref 0–0.9)
MONOCYTES NFR BLD AUTO: 9.7 % — SIGNIFICANT CHANGE UP (ref 2–14)
MONOCYTES NFR BLD AUTO: 9.7 % — SIGNIFICANT CHANGE UP (ref 2–14)
NEUTROPHILS # BLD AUTO: 2.44 K/UL — SIGNIFICANT CHANGE UP (ref 1.8–7.4)
NEUTROPHILS # BLD AUTO: 2.44 K/UL — SIGNIFICANT CHANGE UP (ref 1.8–7.4)
NEUTROPHILS NFR BLD AUTO: 50.6 % — SIGNIFICANT CHANGE UP (ref 43–77)
NEUTROPHILS NFR BLD AUTO: 50.6 % — SIGNIFICANT CHANGE UP (ref 43–77)
NRBC # BLD: 0 /100 WBCS — SIGNIFICANT CHANGE UP (ref 0–0)
NRBC # BLD: 0 /100 WBCS — SIGNIFICANT CHANGE UP (ref 0–0)
NRBC # FLD: 0 K/UL — SIGNIFICANT CHANGE UP (ref 0–0)
NRBC # FLD: 0 K/UL — SIGNIFICANT CHANGE UP (ref 0–0)
PLATELET # BLD AUTO: 163 K/UL — SIGNIFICANT CHANGE UP (ref 150–400)
PLATELET # BLD AUTO: 163 K/UL — SIGNIFICANT CHANGE UP (ref 150–400)
POTASSIUM SERPL-MCNC: 4.2 MMOL/L — SIGNIFICANT CHANGE UP (ref 3.5–5.3)
POTASSIUM SERPL-MCNC: 4.2 MMOL/L — SIGNIFICANT CHANGE UP (ref 3.5–5.3)
POTASSIUM SERPL-SCNC: 4.2 MMOL/L — SIGNIFICANT CHANGE UP (ref 3.5–5.3)
POTASSIUM SERPL-SCNC: 4.2 MMOL/L — SIGNIFICANT CHANGE UP (ref 3.5–5.3)
PROT SERPL-MCNC: 7.2 G/DL — SIGNIFICANT CHANGE UP (ref 6–8.3)
PROT SERPL-MCNC: 7.2 G/DL — SIGNIFICANT CHANGE UP (ref 6–8.3)
PROTHROM AB SERPL-ACNC: 15.3 SEC — HIGH (ref 9.5–13)
PROTHROM AB SERPL-ACNC: 15.3 SEC — HIGH (ref 9.5–13)
RBC # BLD: 4.91 M/UL — SIGNIFICANT CHANGE UP (ref 3.8–5.2)
RBC # BLD: 4.91 M/UL — SIGNIFICANT CHANGE UP (ref 3.8–5.2)
RBC # FLD: 14.1 % — SIGNIFICANT CHANGE UP (ref 10.3–14.5)
RBC # FLD: 14.1 % — SIGNIFICANT CHANGE UP (ref 10.3–14.5)
SODIUM SERPL-SCNC: 141 MMOL/L — SIGNIFICANT CHANGE UP (ref 135–145)
SODIUM SERPL-SCNC: 141 MMOL/L — SIGNIFICANT CHANGE UP (ref 135–145)
WBC # BLD: 4.83 K/UL — SIGNIFICANT CHANGE UP (ref 3.8–10.5)
WBC # BLD: 4.83 K/UL — SIGNIFICANT CHANGE UP (ref 3.8–10.5)
WBC # FLD AUTO: 4.83 K/UL — SIGNIFICANT CHANGE UP (ref 3.8–10.5)
WBC # FLD AUTO: 4.83 K/UL — SIGNIFICANT CHANGE UP (ref 3.8–10.5)

## 2023-12-22 PROCEDURE — 43239 EGD BIOPSY SINGLE/MULTIPLE: CPT | Mod: GC

## 2023-12-22 PROCEDURE — 88305 TISSUE EXAM BY PATHOLOGIST: CPT | Mod: 26

## 2023-12-22 PROCEDURE — 74177 CT ABD & PELVIS W/CONTRAST: CPT | Mod: 26

## 2023-12-22 PROCEDURE — 99285 EMERGENCY DEPT VISIT HI MDM: CPT

## 2023-12-22 PROCEDURE — 71045 X-RAY EXAM CHEST 1 VIEW: CPT | Mod: 26

## 2023-12-22 PROCEDURE — 99223 1ST HOSP IP/OBS HIGH 75: CPT

## 2023-12-22 PROCEDURE — 99222 1ST HOSP IP/OBS MODERATE 55: CPT | Mod: GC,25

## 2023-12-22 RX ORDER — LANOLIN ALCOHOL/MO/W.PET/CERES
3 CREAM (GRAM) TOPICAL AT BEDTIME
Refills: 0 | Status: DISCONTINUED | OUTPATIENT
Start: 2023-12-22 | End: 2023-12-23

## 2023-12-22 RX ORDER — SODIUM CHLORIDE 9 MG/ML
1000 INJECTION INTRAMUSCULAR; INTRAVENOUS; SUBCUTANEOUS ONCE
Refills: 0 | Status: COMPLETED | OUTPATIENT
Start: 2023-12-22 | End: 2023-12-22

## 2023-12-22 RX ORDER — FOLIC ACID 0.8 MG
1 TABLET ORAL DAILY
Refills: 0 | Status: DISCONTINUED | OUTPATIENT
Start: 2023-12-22 | End: 2023-12-23

## 2023-12-22 RX ORDER — PANTOPRAZOLE SODIUM 20 MG/1
80 TABLET, DELAYED RELEASE ORAL ONCE
Refills: 0 | Status: COMPLETED | OUTPATIENT
Start: 2023-12-22 | End: 2023-12-22

## 2023-12-22 RX ORDER — ADALIMUMAB 40MG/0.8ML
0 KIT SUBCUTANEOUS
Refills: 0 | DISCHARGE

## 2023-12-22 RX ORDER — DILTIAZEM HCL 120 MG
120 CAPSULE, EXT RELEASE 24 HR ORAL DAILY
Refills: 0 | Status: DISCONTINUED | OUTPATIENT
Start: 2023-12-22 | End: 2023-12-23

## 2023-12-22 RX ORDER — METHOTREXATE 2.5 MG/1
15 TABLET ORAL
Refills: 0 | Status: DISCONTINUED | OUTPATIENT
Start: 2023-12-22 | End: 2023-12-22

## 2023-12-22 RX ORDER — ACETAMINOPHEN 500 MG
650 TABLET ORAL EVERY 6 HOURS
Refills: 0 | Status: DISCONTINUED | OUTPATIENT
Start: 2023-12-22 | End: 2023-12-23

## 2023-12-22 RX ORDER — PANTOPRAZOLE SODIUM 20 MG/1
40 TABLET, DELAYED RELEASE ORAL
Refills: 0 | Status: DISCONTINUED | OUTPATIENT
Start: 2023-12-22 | End: 2023-12-23

## 2023-12-22 RX ORDER — DILTIAZEM HCL 120 MG
1 CAPSULE, EXT RELEASE 24 HR ORAL
Refills: 0 | DISCHARGE

## 2023-12-22 RX ADMIN — PANTOPRAZOLE SODIUM 80 MILLIGRAM(S): 20 TABLET, DELAYED RELEASE ORAL at 08:34

## 2023-12-22 RX ADMIN — SODIUM CHLORIDE 1000 MILLILITER(S): 9 INJECTION INTRAMUSCULAR; INTRAVENOUS; SUBCUTANEOUS at 08:33

## 2023-12-22 NOTE — ED PROVIDER NOTE - ATTENDING CONTRIBUTION TO CARE
53-year-old female past medical history for paroxysmal atrial fibrillation and rheumatoid arthritis.  Patient with recent hemorrhoidectomy and was prescribed ibuprofen for analgesia.  Patient states for past week she has been taking ibuprofen 600 mg every 6 hours in addition to Xarelto.  Patient noted abdominal discomfort with eating and dark/maroon stools last night.  No reported shortness of breath, no chest pain, no vomiting.  Meds: Xarelto, ibuprofen, Humira, methotrexate, Cardizem.    PE:  No distress  lungs CTA b/l  CVS RRR S1S2  Abd no guarding to full palpation of abdomen.   Ext No icterus, no jaundice  Rectal : + maroon stools note perirectal area    A: GI bleed  Plan: GI emailed via protocol. PPI ordered, pt requires admission for GI bleed, monitoring, serial H/H 53-year-old female past medical history for paroxysmal atrial fibrillation and rheumatoid arthritis.  Patient with recent hemorrhoidectomy and was prescribed ibuprofen for analgesia.  Patient states for past week she has been taking ibuprofen 600 mg every 6 hours in addition to Xarelto.  Patient noted abdominal discomfort with eating and dark/maroon stools last night.  No reported shortness of breath, no chest pain, no vomiting.  Meds: Xarelto, ibuprofen, Humira, methotrexate, Cardizem.    PE:  No distress  lungs CTA b/l  CVS RRR S1S2  Abd no guarding to full palpation of abdomen.   Ext No icterus, no jaundice  Rectal : + maroon stools note perirectal area    A: GI bleed  Plan: GI emailed via protocol. PPI ordered, pt requires admission for GI bleed, monitoring, serial H/H.  I independently reviewed and interpreted EKG and chest x-ray are

## 2023-12-22 NOTE — ED ADULT TRIAGE NOTE - CHIEF COMPLAINT QUOTE
Pt had dark bloody stool x3 this morning. She has history of Afib and is on Xarelto. Also had hemorrhoidectomy two weeks ago. Pt is c/o bloated feeling after eating for few days. c/o feeling weak. Denies dizziness, CP or SOB

## 2023-12-22 NOTE — PATIENT PROFILE ADULT - FALL HARM RISK - UNIVERSAL INTERVENTIONS
Bed in lowest position, wheels locked, appropriate side rails in place/Call bell, personal items and telephone in reach/Instruct patient to call for assistance before getting out of bed or chair/Non-slip footwear when patient is out of bed/[place] to call system/Physically safe environment - no spills, clutter or unnecessary equipment/Purposeful Proactive Rounding/Room/bathroom lighting operational, light cord in reach Bed in lowest position, wheels locked, appropriate side rails in place/Call bell, personal items and telephone in reach/Instruct patient to call for assistance before getting out of bed or chair/Non-slip footwear when patient is out of bed/[place] to call system/Physically safe environment - no spills, clutter or unnecessary equipment/Purposeful Proactive Rounding/Room/bathroom lighting operational, light cord in reach

## 2023-12-22 NOTE — CONSULT NOTE ADULT - SUBJECTIVE AND OBJECTIVE BOX
SURGERY CONSULT NOTE    Patient is a 53y old  Female who presents with a chief complaint of rectal bleeding, abdominal pain (22 Dec 2023 10:58)      HPI:  53F PMH RA (Humira, Methotrexate), AF (Xarelto, last dose 12/21 PM), external hemorrhoid s/p lancing 12/4 and s/p hemorrhoidectomy 12/8 presenting with 1 day of rectal bleeding with blood clots. Pt has been taking ibuprofen 600mg q6h for pain control after hemorrhoidectomy. 3 days ago, pt started to experience epigastric abdominal pain associated with nausea and bloating. Reports that on the morning of admission, she had 3 episodes of rectal bleeding with blood clots; no melena. Associated rectal pain since hemorrhoidectomy but that has been improving. No previous EGD; last colonoscopy 2021 showing hemorrhoids; no other pathology. Pt denies fever, chills, chest pain, sob, diarrhea, constipation. (22 Dec 2023 14:35)    POD14 s/p hemorrhoidectomy. Restarted Xarelto POD6. Taking ibuprofen 600 2-3x per day as part of multimodal pain control up until POD10. Stopped pain meds at that time due to longer having postop pain. she has been taking daily metamucil/miralax. Passed blood clots this morning around 5am and patient was concerned for UGI bleed and presented to the ED. Admitted to medicine for UGI scope with unremarkable findings. Surgery consulted for evaluation. P        PAST MEDICAL & SURGICAL HISTORY:  RA (rheumatoid arthritis)      Obesity      GERD (gastroesophageal reflux disease)      Uterine leiomyoma      History of ITP      Atrial fibrillation      HTN (hypertension)      DDD (degenerative disc disease), cervical      OA (osteoarthritis)      S/P tubal ligation  2007      H/O colonoscopy      History of left knee replacement        [  ] No significant past history as reviewed with the patient and family    FAMILY HISTORY:  Family history of diabetes mellitus (DM) (Sibling)    Family history of coronary artery bypass graft surgery (Sibling)    Family history of stroke (Sibling)      [  ] Family history not pertinent as reviewed with the patient and family    SOCIAL HISTORY:    MEDICATIONS  (STANDING):  diltiazem    Tablet 120 milliGRAM(s) Oral daily  folic acid 1 milliGRAM(s) Oral daily  methotrexate 15 milliGRAM(s) Oral <User Schedule>  pantoprazole    Tablet 40 milliGRAM(s) Oral before breakfast    MEDICATIONS  (PRN):  acetaminophen     Tablet .. 650 milliGRAM(s) Oral every 6 hours PRN Temp greater or equal to 38C (100.4F), Mild Pain (1 - 3)  aluminum hydroxide/magnesium hydroxide/simethicone Suspension 30 milliLiter(s) Oral every 4 hours PRN Dyspepsia  melatonin 3 milliGRAM(s) Oral at bedtime PRN Insomnia    Allergies    sulfa drugs (Unknown)    Intolerances        Vital Signs Last 24 Hrs  T(C): 36.8 (22 Dec 2023 12:42), Max: 36.8 (22 Dec 2023 12:42)  T(F): 98.2 (22 Dec 2023 12:42), Max: 98.2 (22 Dec 2023 12:42)  HR: 70 (22 Dec 2023 13:12) (65 - 76)  BP: 118/95 (22 Dec 2023 13:12) (106/63 - 134/81)  BP(mean): --  RR: 70 (22 Dec 2023 13:12) (17 - 70)  SpO2: 96% (22 Dec 2023 13:12) (94% - 99%)    Parameters below as of 22 Dec 2023 13:12  Patient On (Oxygen Delivery Method): room air    PHYSICAL EXAM  General: No acute distress, resting in bed. Pleasant.  Respiratory: Nonlabored respirations  Cardio: regular rate  Rectal: well healing site of excised right external hemorrhoid, RAUDEL with very minute amount of bright red blood on palpation of excised right anterior internal hemorrhoid      Daily Height in cm: 172.72 (22 Dec 2023 11:50)    Daily                             14.7   4.83  )-----------( 163      ( 22 Dec 2023 08:30 )             44.7     12-22    141  |  107  |  14  ----------------------------<  78  4.2   |  24  |  0.78    Ca    9.7      22 Dec 2023 08:30    TPro  7.2  /  Alb  3.8  /  TBili  0.5  /  DBili  x   /  AST  19  /  ALT  32  /  AlkPhos  76  12-22    PT/INR - ( 22 Dec 2023 08:30 )   PT: 15.3 sec;   INR: 1.38 ratio         PTT - ( 22 Dec 2023 08:30 )  PTT:36.3 sec  Urinalysis Basic - ( 22 Dec 2023 08:30 )    Color: x / Appearance: x / SG: x / pH: x  Gluc: 78 mg/dL / Ketone: x  / Bili: x / Urobili: x   Blood: x / Protein: x / Nitrite: x   Leuk Esterase: x / RBC: x / WBC x   Sq Epi: x / Non Sq Epi: x / Bacteria: x

## 2023-12-22 NOTE — CONSULT NOTE ADULT - ASSESSMENT
53F POD14 s/p hemorrhoidectomy presents with blood clots per rectum after restarting Xarelto. H/h stable. VSS.     Plan/recs:  - No acute surgical intervention.   - Proctosol, sitz baths  - Continue current bowel reg of miralax/metamucil, can hold miralax for loose stools.  - Can hold Xarelto if continues to have blood per rectum.    Patient discussed with Attending Surgeon Dr. Whitley  Surgery Team A  [id number]     53F POD14 s/p hemorrhoidectomy presents with blood clots per rectum after restarting Xarelto. H/h stable. VSS.     Plan/recs:  - No acute surgical intervention.   - Proctosol, sitz baths  - Continue current bowel reg of miralax/metamucil, can hold miralax for loose stools.  - Can hold Xarelto if continues to have blood per rectum.    Patient discussed with Attending Surgeon Dr. Whitley  Surgery Team A  [id number]

## 2023-12-22 NOTE — H&P ADULT - NSHPPHYSICALEXAM_GEN_ALL_CORE
VITAL SIGNS:  T(C): 36.8 (12-22-23 @ 12:42), Max: 36.8 (12-22-23 @ 12:42)  T(F): 98.2 (12-22-23 @ 12:42), Max: 98.2 (12-22-23 @ 12:42)  HR: 70 (12-22-23 @ 13:12) (65 - 76)  BP: 118/95 (12-22-23 @ 13:12) (106/63 - 134/81)  BP(mean): --  RR: 70 (12-22-23 @ 13:12) (17 - 70)  SpO2: 96% (12-22-23 @ 13:12) (94% - 99%)  Wt(kg): --    PHYSICAL EXAM:    Constitutional: NAD; resting comfortably in bed  ENT: no nasal discharge; MMM  Neck: supple  Respiratory: CTA B/L; no W/R/R, no retractions  Cardiac: +S1/S2; RRR; no M/R/G  Gastrointestinal: soft, NT/ND; no rebound or guarding  Extremities: WWP, no clubbing or cyanosis; no peripheral edema  Vascular: 2+ radial, DP pulses B/L  Neurologic: AAOx3; CNII-XII grossly intact; no focal deficits  Psychiatric: affect and characteristics of appearance, verbalizations, behaviors are appropriate

## 2023-12-22 NOTE — CONSULT NOTE ADULT - ATTENDING COMMENTS
# Epigastric pain, recent NSAID use: Possibly abdominal discomfort in setting of significant NSAID use.  # Rectal bleeding: History consistent with LGIB. No evidence of accelerated azotemia and Hgb stable/normal. Suspect likely in setting of recent hemorrhoidectomy. Diverticular vs angiodysplasias vs polyps/mass vs UGIB etiologies lower on differential.   # RA on Humira, Methotrexate  # AF on Xarelto   # History of thrombosed hemorrhoids s/p recent hemorrhoidectomy 12/8 (path c/w AIN)    --Suspect abdominal pain likely in setting of significant NSAID use, possibly underlying NSAID-induced PUD. Low suspicion for UGIB, but discussed potential EGD to assess for etiology of new patient and patient agreeable to diagnostic evaluation. R/B/A of procedure discussed. Risks including, but not limited to, anesthesia, infection, bleeding perforation, missed lesions were reviewed.   --PPI BID for now  --Suggest colorectal surgery (Dr. Whitley) consultation for rectal bleeding in setting of recent hemorrhoidectomy  --Surveillance/management of recently diagnosed AIN per colorectal surgery team    Additional recommendations as above.     Tate Dang MD  GI Hospitalist  Division of Gastroenterology

## 2023-12-22 NOTE — H&P ADULT - PROBLEM SELECTOR PLAN 3
- EGD 12/22 with supspected small esophageal varices without high risk stigmata  - denies excessive alcohol use  - denies hx of hepatitis or IVDU  Plan:  - f/u CTAP with IV contrast

## 2023-12-22 NOTE — ED PROVIDER NOTE - PHYSICAL EXAMINATION
GENERAL: no acute distress, non-toxic appearing  HEAD: normocephalic, atraumatic  HEENT: PERRLA, EOMI, normal conjunctiva, oral mucosa moist  CARDIAC: regular rate and rhythm, normal S1 and S2, no appreciable murmurs  PULM: clear to ascultation bilaterally  GI: abdomen nondistended, soft, nontender, no guarding or rebound tenderness  Rectal: soft hemorrhoid, no bleeding, +melena, no active bleeding  NEURO: alert and oriented x 3, normal speech, no gross neurologic deficit  MSK: no visible deformities, no peripheral edema, calf tenderness/redness/swelling  SKIN: no visible rashes, dry, well-perfused  PSYCH: appropriate mood and affect

## 2023-12-22 NOTE — ED ADULT NURSE NOTE - OBJECTIVE STATEMENT
Pt received in room 2. Pt AxOx4 and ambulatory. Pt states she had 3 episodes of dark and bloody stool this morning. Pt endorses weakness. Pt states she is on Xarelto for atrial fibrillation. Pt states she has never had GI bleed before. Denies chest pain, dizziness, SOB. 20g PIV placed in left AC. Meds given. Cardiac monitor placed. Care on going.

## 2023-12-22 NOTE — CONSULT NOTE ADULT - SUBJECTIVE AND OBJECTIVE BOX
Chief Complaint:  Patient is a 53y old  Female who presents with a chief complaint of     HPI:  53F with hx of RA (Humira, Methotrexare), AF (Xarelto, last dose 12/21 PM), external hemorrhoid s/p lancing 12/4 and s/p hemorrhoidectomy 12/8 presenting with 3 days of epigastric abdominal pain associated with nausea and bloating. Reports this AM, 3 episodes of rectal bleeding with blood clots; no melena. Associated rectal pain since hemorrhoidectomy but that has been improving. No previous EGD; last colonoscopy 2021 showing hemorrhoids; no other pathology. Of note, pt was significant NSAID hx; ibuprofen 600mg Q6H since her 12/4 for pain control.    Allergies:  sulfa drugs (Unknown)      Home Medications:    Hospital Medications:      PMHX/PSHX:  No pertinent past medical history    RA (rheumatoid arthritis)    Obesity    GERD (gastroesophageal reflux disease)    Uterine leiomyoma    History of ITP    Atrial fibrillation    HTN (hypertension)    DDD (degenerative disc disease), lumbar    DDD (degenerative disc disease), cervical    OA (osteoarthritis)    No significant past surgical history    S/P tubal ligation    H/O colonoscopy    History of left knee replacement        Family history:  No significant family history    No pertinent family history in first degree relatives    Family history of diabetes mellitus (DM) (Sibling)    Family history of coronary artery bypass graft surgery (Sibling)    Family history of stroke (Sibling)        Denies family history of colon cancer/polyps, stomach cancer/polyps, pancreatic cancer/masses, liver cancer/disease, ovarian cancer and endometrial cancer.    Social History:     Tob: Denies  EtOH: Denies  Illicit Drugs: Denies    ROS:     General:  No wt loss, fevers, chills  ENT:  No dysphagia  CV:  No pain, palpitations  Pulm:  No dyspnea, cough  GI:  (+) rectal pain, No nausea, No vomiting, No diarrhea, No constipation, (+) rectal bleeding, No tarry stools, No dysphagia,  Muscle:  No pain, weakness  Neuro:  No weakness  Heme:  No ecchymosis  Skin:  No rash    PHYSICAL EXAM:     GENERAL:  No acute distress  HEENT:  no scleral icterus  CHEST:  no accessory muscle use  HEART:  Regular rate and rhythm  ABDOMEN:  Soft, non-tender, non-distended  RECTAL (chaperoned by ZEV Fitzpatrick): significant pain with exam; bright red blood noted; no stool; external hemorrhoid; no anal fissure  EXTREMITIES: No edema  SKIN:  No rash/ecchymoses  NEURO:  Alert and oriented x 3    Vital Signs:  Vital Signs Last 24 Hrs  T(C): 36.5 (22 Dec 2023 07:18), Max: 36.5 (22 Dec 2023 07:18)  T(F): 97.7 (22 Dec 2023 07:18), Max: 97.7 (22 Dec 2023 07:18)  HR: 72 (22 Dec 2023 07:18) (72 - 72)  BP: 134/81 (22 Dec 2023 07:18) (134/81 - 134/81)  BP(mean): --  RR: 20 (22 Dec 2023 07:18) (20 - 20)  SpO2: 99% (22 Dec 2023 07:18) (99% - 99%)    Parameters below as of 22 Dec 2023 07:18  Patient On (Oxygen Delivery Method): room air      Daily Height in cm: 165.1 (22 Dec 2023 07:18)    Daily     LABS:                        14.7   4.83  )-----------( 163      ( 22 Dec 2023 08:30 )             44.7     Mean Cell Volume: 91.0 fL (12-22-23 @ 08:30)    12-22    141  |  107  |  14  ----------------------------<  78  4.2   |  24  |  0.78    Ca    9.7      22 Dec 2023 08:30    TPro  7.2  /  Alb  3.8  /  TBili  0.5  /  DBili  x   /  AST  19  /  ALT  32  /  AlkPhos  76  12-22    LIVER FUNCTIONS - ( 22 Dec 2023 08:30 )  Alb: 3.8 g/dL / Pro: 7.2 g/dL / ALK PHOS: 76 U/L / ALT: 32 U/L / AST: 19 U/L / GGT: x           PT/INR - ( 22 Dec 2023 08:30 )   PT: 15.3 sec;   INR: 1.38 ratio         PTT - ( 22 Dec 2023 08:30 )  PTT:36.3 sec  Urinalysis Basic - ( 22 Dec 2023 08:30 )    Color: x / Appearance: x / SG: x / pH: x  Gluc: 78 mg/dL / Ketone: x  / Bili: x / Urobili: x   Blood: x / Protein: x / Nitrite: x   Leuk Esterase: x / RBC: x / WBC x   Sq Epi: x / Non Sq Epi: x / Bacteria: x                              14.7   4.83  )-----------( 163      ( 22 Dec 2023 08:30 )             44.7   HPI:  53F with hx of RA (Humira, Methotrexate), AF (Xarelto, last dose 12/21 PM), external hemorrhoid s/p lancing 12/4 and s/p hemorrhoidectomy 12/8 presenting with 3 days of epigastric abdominal pain associated with nausea and bloating. Reports this AM, 3 episodes of rectal bleeding with blood clots; no melena. Associated rectal pain since hemorrhoidectomy but that has been improving. No previous EGD; last colonoscopy 2021 showing hemorrhoids; no other pathology. Of note, pt was significant NSAID hx; ibuprofen 600mg Q6H since her 12/4 for pain control.    Allergies:  sulfa drugs (Unknown)      Home Medications:    Hospital Medications:      PMHX/PSHX:  No pertinent past medical history    RA (rheumatoid arthritis)    Obesity    GERD (gastroesophageal reflux disease)    Uterine leiomyoma    History of ITP    Atrial fibrillation    HTN (hypertension)    DDD (degenerative disc disease), lumbar    DDD (degenerative disc disease), cervical    OA (osteoarthritis)    No significant past surgical history    S/P tubal ligation    H/O colonoscopy    History of left knee replacement    Family history:  No significant family history    No pertinent family history in first degree relatives    Family history of diabetes mellitus (DM) (Sibling)    Family history of coronary artery bypass graft surgery (Sibling)    Family history of stroke (Sibling)    Social History:     Tob: Denies  EtOH: Denies  Illicit Drugs: Denies    ROS:     General:  No wt loss, fevers, chills  ENT:  No dysphagia  CV:  No pain, palpitations  Pulm:  No dyspnea, cough  GI:  (+) rectal pain, No nausea, No vomiting, No diarrhea, No constipation, (+) rectal bleeding, No tarry stools, No dysphagia,  Muscle:  No pain, weakness  Neuro:  No weakness  Heme:  No ecchymosis  Skin:  No rash    PHYSICAL EXAM:     GENERAL:  No acute distress  HEENT:  no scleral icterus  CHEST:  no accessory muscle use  HEART:  Regular rate and rhythm  ABDOMEN:  Soft, non-tender, non-distended  RECTAL (chaperoned by ZEV Fitzpatrick): significant pain with exam; bright red blood noted; no stool; external hemorrhoid; no anal fissure  EXTREMITIES: No edema  SKIN:  No rash/ecchymoses  NEURO:  Alert and oriented x 3    Vital Signs:  Vital Signs Last 24 Hrs  T(C): 36.5 (22 Dec 2023 07:18), Max: 36.5 (22 Dec 2023 07:18)  T(F): 97.7 (22 Dec 2023 07:18), Max: 97.7 (22 Dec 2023 07:18)  HR: 72 (22 Dec 2023 07:18) (72 - 72)  BP: 134/81 (22 Dec 2023 07:18) (134/81 - 134/81)  BP(mean): --  RR: 20 (22 Dec 2023 07:18) (20 - 20)  SpO2: 99% (22 Dec 2023 07:18) (99% - 99%)    Parameters below as of 22 Dec 2023 07:18  Patient On (Oxygen Delivery Method): room air      Daily Height in cm: 165.1 (22 Dec 2023 07:18)    Daily     LABS:                        14.7   4.83  )-----------( 163      ( 22 Dec 2023 08:30 )             44.7     Mean Cell Volume: 91.0 fL (12-22-23 @ 08:30)    12-22    141  |  107  |  14  ----------------------------<  78  4.2   |  24  |  0.78    Ca    9.7      22 Dec 2023 08:30    TPro  7.2  /  Alb  3.8  /  TBili  0.5  /  DBili  x   /  AST  19  /  ALT  32  /  AlkPhos  76  12-22    LIVER FUNCTIONS - ( 22 Dec 2023 08:30 )  Alb: 3.8 g/dL / Pro: 7.2 g/dL / ALK PHOS: 76 U/L / ALT: 32 U/L / AST: 19 U/L / GGT: x           PT/INR - ( 22 Dec 2023 08:30 )   PT: 15.3 sec;   INR: 1.38 ratio         PTT - ( 22 Dec 2023 08:30 )  PTT:36.3 sec  Urinalysis Basic - ( 22 Dec 2023 08:30 )    Color: x / Appearance: x / SG: x / pH: x  Gluc: 78 mg/dL / Ketone: x  / Bili: x / Urobili: x   Blood: x / Protein: x / Nitrite: x   Leuk Esterase: x / RBC: x / WBC x   Sq Epi: x / Non Sq Epi: x / Bacteria: x                        14.7   4.83  )-----------( 163      ( 22 Dec 2023 08:30 )             44.7

## 2023-12-22 NOTE — ED ADULT NURSE NOTE - NSFALLHARMRISKINTERV_ED_ALL_ED
Communicate risk of Fall with Harm to all staff, patient, and family/Provide visual cue: red socks, yellow wristband, yellow gown, etc/Reinforce activity limits and safety measures with patient and family/Bed in lowest position, wheels locked, appropriate side rails in place/Call bell, personal items and telephone in reach/Instruct patient to call for assistance before getting out of bed/chair/stretcher/Non-slip footwear applied when patient is off stretcher/[place] to call system/Physically safe environment - no spills, clutter or unnecessary equipment/Purposeful Proactive Rounding/Room/bathroom lighting operational, light cord in reach Communicate risk of Fall with Harm to all staff, patient, and family/Provide visual cue: red socks, yellow wristband, yellow gown, etc/Reinforce activity limits and safety measures with patient and family/Bed in lowest position, wheels locked, appropriate side rails in place/Call bell, personal items and telephone in reach/Instruct patient to call for assistance before getting out of bed/chair/stretcher/Non-slip footwear applied when patient is off stretcher/[place] to call system/Physically safe environment - no spills, clutter or unnecessary equipment/Purposeful Proactive Rounding/Room/bathroom lighting operational, light cord in reach

## 2023-12-22 NOTE — H&P ADULT - HISTORY OF PRESENT ILLNESS
53F PMH RA (Humira, Methotrexate), AF (Xarelto, last dose 12/21 PM), external hemorrhoid s/p lancing 12/4 and s/p hemorrhoidectomy 12/8 presenting with 1 day of rectal bleeding with blood clots. Pt has been taking ibuprofen 600mg q6h for pain control after hemorrhoidectomy. 3 days ago, pt started to experience epigastric abdominal pain associated with nausea and bloating. Reports that on the morning of admission, she had 3 episodes of rectal bleeding with blood clots; no melena. Associated rectal pain since hemorrhoidectomy but that has been improving. No previous EGD; last colonoscopy 2021 showing hemorrhoids; no other pathology. Pt denies fever, chills, chest pain, sob, diarrhea, constipation.

## 2023-12-22 NOTE — ED PROVIDER NOTE - OBJECTIVE STATEMENT
Patient is a 53-year-old history of RA, A-fib on Xarelto, hemorrhoidectomy 2 weeks ago, presenting with rectal bleeding.  Patient states she had been feeling a lot better since the procedure, however has been taking ibuprofen and Tylenol for 10 days straight every few hours.  Past few days she felt like she was nauseous, not feeling well after eating.  Then today she had a couple of episodes of dark bloody stools.  States her rectal pain has improved, however is now concerned she has a GI bleed. Has been feeling generally weak. Denies syncope, chest pain, dizziness, shortness of breath, abdominal pain.

## 2023-12-22 NOTE — ED PROVIDER NOTE - CLINICAL SUMMARY MEDICAL DECISION MAKING FREE TEXT BOX
Patient overall well-appearing, EKG normal sinus rhythm, not tachycardic, blood pressure normotensive.  Abdomen soft, nontender.  Patient showed picture of stool with about 1 cup maroon blood.  Patient most likely with upper GI bleed, no coffee-ground emesis.  Will give 80 Protonix, fluids, chest x-ray rule out free air, perforation, however abdomen soft nontender, low suspicion at this time.  Patient likely to be admitted for scope. Currently hemodynamically stable, will continue to monitor.

## 2023-12-22 NOTE — H&P ADULT - NSHPLABSRESULTS_GEN_ALL_CORE
LABS:                         14.7   4.83  )-----------( 163      ( 22 Dec 2023 08:30 )             44.7     12-22    141  |  107  |  14  ----------------------------<  78  4.2   |  24  |  0.78    Ca    9.7      22 Dec 2023 08:30    TPro  7.2  /  Alb  3.8  /  TBili  0.5  /  DBili  x   /  AST  19  /  ALT  32  /  AlkPhos  76  12-22    PT/INR - ( 22 Dec 2023 08:30 )   PT: 15.3 sec;   INR: 1.38 ratio         PTT - ( 22 Dec 2023 08:30 )  PTT:36.3 sec  Urinalysis Basic - ( 22 Dec 2023 08:30 )    Color: x / Appearance: x / SG: x / pH: x  Gluc: 78 mg/dL / Ketone: x  / Bili: x / Urobili: x   Blood: x / Protein: x / Nitrite: x   Leuk Esterase: x / RBC: x / WBC x   Sq Epi: x / Non Sq Epi: x / Bacteria: x                RADIOLOGY, EKG & ADDITIONAL TESTS: Reviewed.

## 2023-12-22 NOTE — H&P ADULT - ASSESSMENT
53F PMH RA (Humira, Methotrexate), AF (Xarelto, last dose 12/21 PM), external hemorrhoid s/p lancing 12/4 and s/p hemorrhoidectomy 12/8 presenting with 1 day of rectal bleeding with blood clots.

## 2023-12-22 NOTE — H&P ADULT - TIME BILLING
Time-based billing (NON-critical care).     More than 50% of the visit was spent counseling and / or coordinating care by the attending physician.      The necessity of the time spent during the encounter on this date of service was due to: documentation in [place], reviewing chart and coordinating care with patient/ACPs and interdisciplinary staff (such as , social workers, etc) as well as reviewing vitals, laboratory data, radiology, medication list, consultants' recommendations and prior records. Interventions were performed as documented above. Time-based billing (NON-critical care).     More than 50% of the visit was spent counseling and / or coordinating care by the attending physician.      The necessity of the time spent during the encounter on this date of service was due to: documentation in [facility], reviewing chart and coordinating care with patient/ACPs and interdisciplinary staff (such as , social workers, etc) as well as reviewing vitals, laboratory data, radiology, medication list, consultants' recommendations and prior records. Interventions were performed as documented above.

## 2023-12-22 NOTE — H&P ADULT - PROBLEM SELECTOR PLAN 1
Pt has hx of hemorrhoid s/p lancing 12/4 and s/p hemorrhoidectomy 12/8. Pt had been taking ibuprofen 600mg q6h for pain control after hemorrhoidectomy. On the morning of admission, had 3 episodes of rectal bleeding with blood clots; no melena. Associated rectal pain since hemorrhoidectomy but that has been improving.   - last colonoscopy 2021 showing hemorrhoids; no other pathology  - Hb stable  - hemodynamically stable  - s/p EGD 12/22 with Suspected small (< 5 mm) esophageal varices. No high risk stigmata. Gastropathy. Biopsied. Suspect likely in setting of recent NSAIDs.  Plan:  - GI recs appreciated  - protonix 40mg x4 weeks  - f/u colorectal consult  - hold xarelto pending colorectal recs

## 2023-12-23 ENCOUNTER — TRANSCRIPTION ENCOUNTER (OUTPATIENT)
Age: 53
End: 2023-12-23

## 2023-12-23 VITALS
TEMPERATURE: 98 F | DIASTOLIC BLOOD PRESSURE: 86 MMHG | HEART RATE: 72 BPM | OXYGEN SATURATION: 99 % | RESPIRATION RATE: 17 BRPM | SYSTOLIC BLOOD PRESSURE: 124 MMHG

## 2023-12-23 PROBLEM — M50.30 OTHER CERVICAL DISC DEGENERATION, UNSPECIFIED CERVICAL REGION: Chronic | Status: ACTIVE | Noted: 2023-12-07

## 2023-12-23 PROBLEM — I48.91 UNSPECIFIED ATRIAL FIBRILLATION: Chronic | Status: ACTIVE | Noted: 2023-12-07

## 2023-12-23 PROBLEM — M19.90 UNSPECIFIED OSTEOARTHRITIS, UNSPECIFIED SITE: Chronic | Status: ACTIVE | Noted: 2023-12-07

## 2023-12-23 LAB
ANION GAP SERPL CALC-SCNC: 11 MMOL/L — SIGNIFICANT CHANGE UP (ref 7–14)
ANION GAP SERPL CALC-SCNC: 11 MMOL/L — SIGNIFICANT CHANGE UP (ref 7–14)
BASOPHILS # BLD AUTO: 0.05 K/UL — SIGNIFICANT CHANGE UP (ref 0–0.2)
BASOPHILS # BLD AUTO: 0.05 K/UL — SIGNIFICANT CHANGE UP (ref 0–0.2)
BASOPHILS NFR BLD AUTO: 1 % — SIGNIFICANT CHANGE UP (ref 0–2)
BASOPHILS NFR BLD AUTO: 1 % — SIGNIFICANT CHANGE UP (ref 0–2)
BUN SERPL-MCNC: 20 MG/DL — SIGNIFICANT CHANGE UP (ref 7–23)
BUN SERPL-MCNC: 20 MG/DL — SIGNIFICANT CHANGE UP (ref 7–23)
CALCIUM SERPL-MCNC: 9.6 MG/DL — SIGNIFICANT CHANGE UP (ref 8.4–10.5)
CALCIUM SERPL-MCNC: 9.6 MG/DL — SIGNIFICANT CHANGE UP (ref 8.4–10.5)
CHLORIDE SERPL-SCNC: 107 MMOL/L — SIGNIFICANT CHANGE UP (ref 98–107)
CHLORIDE SERPL-SCNC: 107 MMOL/L — SIGNIFICANT CHANGE UP (ref 98–107)
CO2 SERPL-SCNC: 23 MMOL/L — SIGNIFICANT CHANGE UP (ref 22–31)
CO2 SERPL-SCNC: 23 MMOL/L — SIGNIFICANT CHANGE UP (ref 22–31)
CREAT SERPL-MCNC: 0.92 MG/DL — SIGNIFICANT CHANGE UP (ref 0.5–1.3)
CREAT SERPL-MCNC: 0.92 MG/DL — SIGNIFICANT CHANGE UP (ref 0.5–1.3)
EGFR: 74 ML/MIN/1.73M2 — SIGNIFICANT CHANGE UP
EGFR: 74 ML/MIN/1.73M2 — SIGNIFICANT CHANGE UP
EOSINOPHIL # BLD AUTO: 0.29 K/UL — SIGNIFICANT CHANGE UP (ref 0–0.5)
EOSINOPHIL # BLD AUTO: 0.29 K/UL — SIGNIFICANT CHANGE UP (ref 0–0.5)
EOSINOPHIL NFR BLD AUTO: 5.6 % — SIGNIFICANT CHANGE UP (ref 0–6)
EOSINOPHIL NFR BLD AUTO: 5.6 % — SIGNIFICANT CHANGE UP (ref 0–6)
GLUCOSE SERPL-MCNC: 70 MG/DL — SIGNIFICANT CHANGE UP (ref 70–99)
GLUCOSE SERPL-MCNC: 70 MG/DL — SIGNIFICANT CHANGE UP (ref 70–99)
HCT VFR BLD CALC: 41.3 % — SIGNIFICANT CHANGE UP (ref 34.5–45)
HCT VFR BLD CALC: 41.3 % — SIGNIFICANT CHANGE UP (ref 34.5–45)
HGB BLD-MCNC: 13.6 G/DL — SIGNIFICANT CHANGE UP (ref 11.5–15.5)
HGB BLD-MCNC: 13.6 G/DL — SIGNIFICANT CHANGE UP (ref 11.5–15.5)
IANC: 2.38 K/UL — SIGNIFICANT CHANGE UP (ref 1.8–7.4)
IANC: 2.38 K/UL — SIGNIFICANT CHANGE UP (ref 1.8–7.4)
IMM GRANULOCYTES NFR BLD AUTO: 0.2 % — SIGNIFICANT CHANGE UP (ref 0–0.9)
IMM GRANULOCYTES NFR BLD AUTO: 0.2 % — SIGNIFICANT CHANGE UP (ref 0–0.9)
LYMPHOCYTES # BLD AUTO: 2 K/UL — SIGNIFICANT CHANGE UP (ref 1–3.3)
LYMPHOCYTES # BLD AUTO: 2 K/UL — SIGNIFICANT CHANGE UP (ref 1–3.3)
LYMPHOCYTES # BLD AUTO: 38.4 % — SIGNIFICANT CHANGE UP (ref 13–44)
LYMPHOCYTES # BLD AUTO: 38.4 % — SIGNIFICANT CHANGE UP (ref 13–44)
MAGNESIUM SERPL-MCNC: 2 MG/DL — SIGNIFICANT CHANGE UP (ref 1.6–2.6)
MAGNESIUM SERPL-MCNC: 2 MG/DL — SIGNIFICANT CHANGE UP (ref 1.6–2.6)
MCHC RBC-ENTMCNC: 29.8 PG — SIGNIFICANT CHANGE UP (ref 27–34)
MCHC RBC-ENTMCNC: 29.8 PG — SIGNIFICANT CHANGE UP (ref 27–34)
MCHC RBC-ENTMCNC: 32.9 GM/DL — SIGNIFICANT CHANGE UP (ref 32–36)
MCHC RBC-ENTMCNC: 32.9 GM/DL — SIGNIFICANT CHANGE UP (ref 32–36)
MCV RBC AUTO: 90.6 FL — SIGNIFICANT CHANGE UP (ref 80–100)
MCV RBC AUTO: 90.6 FL — SIGNIFICANT CHANGE UP (ref 80–100)
MONOCYTES # BLD AUTO: 0.48 K/UL — SIGNIFICANT CHANGE UP (ref 0–0.9)
MONOCYTES # BLD AUTO: 0.48 K/UL — SIGNIFICANT CHANGE UP (ref 0–0.9)
MONOCYTES NFR BLD AUTO: 9.2 % — SIGNIFICANT CHANGE UP (ref 2–14)
MONOCYTES NFR BLD AUTO: 9.2 % — SIGNIFICANT CHANGE UP (ref 2–14)
NEUTROPHILS # BLD AUTO: 2.38 K/UL — SIGNIFICANT CHANGE UP (ref 1.8–7.4)
NEUTROPHILS # BLD AUTO: 2.38 K/UL — SIGNIFICANT CHANGE UP (ref 1.8–7.4)
NEUTROPHILS NFR BLD AUTO: 45.6 % — SIGNIFICANT CHANGE UP (ref 43–77)
NEUTROPHILS NFR BLD AUTO: 45.6 % — SIGNIFICANT CHANGE UP (ref 43–77)
NRBC # BLD: 0 /100 WBCS — SIGNIFICANT CHANGE UP (ref 0–0)
NRBC # BLD: 0 /100 WBCS — SIGNIFICANT CHANGE UP (ref 0–0)
NRBC # FLD: 0 K/UL — SIGNIFICANT CHANGE UP (ref 0–0)
NRBC # FLD: 0 K/UL — SIGNIFICANT CHANGE UP (ref 0–0)
PHOSPHATE SERPL-MCNC: 3.2 MG/DL — SIGNIFICANT CHANGE UP (ref 2.5–4.5)
PHOSPHATE SERPL-MCNC: 3.2 MG/DL — SIGNIFICANT CHANGE UP (ref 2.5–4.5)
PLATELET # BLD AUTO: 147 K/UL — LOW (ref 150–400)
PLATELET # BLD AUTO: 147 K/UL — LOW (ref 150–400)
POTASSIUM SERPL-MCNC: 4.1 MMOL/L — SIGNIFICANT CHANGE UP (ref 3.5–5.3)
POTASSIUM SERPL-MCNC: 4.1 MMOL/L — SIGNIFICANT CHANGE UP (ref 3.5–5.3)
POTASSIUM SERPL-SCNC: 4.1 MMOL/L — SIGNIFICANT CHANGE UP (ref 3.5–5.3)
POTASSIUM SERPL-SCNC: 4.1 MMOL/L — SIGNIFICANT CHANGE UP (ref 3.5–5.3)
RBC # BLD: 4.56 M/UL — SIGNIFICANT CHANGE UP (ref 3.8–5.2)
RBC # BLD: 4.56 M/UL — SIGNIFICANT CHANGE UP (ref 3.8–5.2)
RBC # FLD: 14.4 % — SIGNIFICANT CHANGE UP (ref 10.3–14.5)
RBC # FLD: 14.4 % — SIGNIFICANT CHANGE UP (ref 10.3–14.5)
SODIUM SERPL-SCNC: 141 MMOL/L — SIGNIFICANT CHANGE UP (ref 135–145)
SODIUM SERPL-SCNC: 141 MMOL/L — SIGNIFICANT CHANGE UP (ref 135–145)
WBC # BLD: 5.21 K/UL — SIGNIFICANT CHANGE UP (ref 3.8–10.5)
WBC # BLD: 5.21 K/UL — SIGNIFICANT CHANGE UP (ref 3.8–10.5)
WBC # FLD AUTO: 5.21 K/UL — SIGNIFICANT CHANGE UP (ref 3.8–10.5)
WBC # FLD AUTO: 5.21 K/UL — SIGNIFICANT CHANGE UP (ref 3.8–10.5)

## 2023-12-23 PROCEDURE — 99239 HOSP IP/OBS DSCHRG MGMT >30: CPT

## 2023-12-23 RX ORDER — HYDROCORTISONE 1 %
1 OINTMENT (GRAM) TOPICAL
Qty: 15 | Refills: 0
Start: 2023-12-23 | End: 2024-01-06

## 2023-12-23 RX ORDER — PANTOPRAZOLE SODIUM 20 MG/1
1 TABLET, DELAYED RELEASE ORAL
Qty: 30 | Refills: 0
Start: 2023-12-23 | End: 2024-01-21

## 2023-12-23 RX ORDER — HYDROCORTISONE 1 %
1 OINTMENT (GRAM) TOPICAL DAILY
Refills: 0 | Status: DISCONTINUED | OUTPATIENT
Start: 2023-12-23 | End: 2023-12-23

## 2023-12-23 RX ORDER — SENNA PLUS 8.6 MG/1
2 TABLET ORAL AT BEDTIME
Refills: 0 | Status: DISCONTINUED | OUTPATIENT
Start: 2023-12-23 | End: 2023-12-23

## 2023-12-23 RX ORDER — SENNA PLUS 8.6 MG/1
2 TABLET ORAL
Qty: 60 | Refills: 0
Start: 2023-12-23 | End: 2024-01-21

## 2023-12-23 RX ORDER — POLYETHYLENE GLYCOL 3350 17 G/17G
17 POWDER, FOR SOLUTION ORAL DAILY
Refills: 0 | Status: DISCONTINUED | OUTPATIENT
Start: 2023-12-23 | End: 2023-12-23

## 2023-12-23 RX ORDER — FLUTICASONE PROPIONATE 50 MCG
1 SPRAY, SUSPENSION NASAL
Refills: 0 | Status: DISCONTINUED | OUTPATIENT
Start: 2023-12-23 | End: 2023-12-23

## 2023-12-23 RX ORDER — ACETAMINOPHEN 500 MG
2 TABLET ORAL
Qty: 0 | Refills: 0 | DISCHARGE
Start: 2023-12-23

## 2023-12-23 RX ORDER — POLYETHYLENE GLYCOL 3350 17 G/17G
17 POWDER, FOR SOLUTION ORAL
Qty: 510 | Refills: 0
Start: 2023-12-23 | End: 2024-01-21

## 2023-12-23 RX ADMIN — Medication 120 MILLIGRAM(S): at 06:29

## 2023-12-23 RX ADMIN — Medication 650 MILLIGRAM(S): at 13:14

## 2023-12-23 RX ADMIN — PANTOPRAZOLE SODIUM 40 MILLIGRAM(S): 20 TABLET, DELAYED RELEASE ORAL at 06:28

## 2023-12-23 RX ADMIN — Medication 1 MILLIGRAM(S): at 11:54

## 2023-12-23 RX ADMIN — POLYETHYLENE GLYCOL 3350 17 GRAM(S): 17 POWDER, FOR SOLUTION ORAL at 11:57

## 2023-12-23 NOTE — PROGRESS NOTE ADULT - ASSESSMENT
53F POD15 s/p hemorrhoidectomy presents with blood clots per rectum after restarting Xarelto. H/h stable. VSS. No bowel movements since admission. Per pt her concern was for UGIB because of dark stool.     Plan/recs:  - No acute surgical intervention.   - Proctosol, sitz baths  - Continue current bowel reg of miralax/metamucil, can hold miralax for loose stools, but sounds like no BMs since admission  - Can hold Xarelto if continues to have blood per rectum  - Rest of care per primary    Surgery Team A  [id number]   53F POD15 s/p hemorrhoidectomy presents with blood clots per rectum after restarting Xarelto. H/h stable. VSS. No bowel movements since admission. Per pt her concern was for UGIB because of dark stool.     Plan/recs:  - No acute surgical intervention.   - Proctosol, sitz baths  - Continue current bowel reg of miralax/metamucil, can hold miralax for loose stools, but sounds like no BMs since admission  - Can hold Xarelto if continues to have blood per rectum  - Rest of care per primary    Surgery Team A  [id number]

## 2023-12-23 NOTE — DISCHARGE NOTE NURSING/CASE MANAGEMENT/SOCIAL WORK - NSDCPEFALRISK_GEN_ALL_CORE
For information on Fall & Injury Prevention, visit: [email or web address] OR  [email or web address] OR  https://www.cdc.gov/steadi/patient.html For information on Fall & Injury Prevention, visit: [email or web address] OR  [email or web address] OR  https://www.cdc.gov/steadi/patient.html

## 2023-12-23 NOTE — CHART NOTE - NSCHARTNOTEFT_GEN_A_CORE
Patient medically cleared for discharge. Discussed with colorectal surgery regarding pathology results. Recommendation is to follow up with outpatient MD Whitley. No further inpatient testing. Discussed with MD Hooper, given stable cbc and improved hematochezia okay to resume home Xarelto. Discussed all with patient.    Thuy Pa NP  [id number] Patient medically cleared for discharge. Discussed with colorectal surgery regarding pathology results. Recommendation is to follow up with outpatient MD Whitley. No further inpatient testing. Discussed with MD Hooper, given stable cbc and improved hematochezia okay to resume home Xarelto. Discussed all with patient.    Thuy Pa NP  [id number]

## 2023-12-23 NOTE — DISCHARGE NOTE PROVIDER - PROVIDER TOKENS
PROVIDER:[TOKEN:[[id number]],FOLLOWUP:[1-3 days]] PROVIDER:[TOKEN:[[id number]],FOLLOWUP:[1-3 days]]

## 2023-12-23 NOTE — DISCHARGE NOTE PROVIDER - CARE PROVIDERS DIRECT ADDRESSES
[email or web address]

## 2023-12-23 NOTE — DISCHARGE NOTE NURSING/CASE MANAGEMENT/SOCIAL WORK - PATIENT PORTAL LINK FT
You can access the FollowMyHealth Patient Portal offered by [facility] by registering at the following website: [email or web address]. By joining [employer]’s FollowMyHealth portal, you will also be able to view your health information using other applications (apps) compatible with our system. You can access the FollowMyHealth Patient Portal offered by [facility] by registering at the following website: [email or web address]. By joining [employer]’s FollowMyHealth portal, you will also be able to view your health information using other applications (apps) compatible with our system.

## 2023-12-23 NOTE — DISCHARGE NOTE PROVIDER - CARE PROVIDER_API CALL
Joby Whitley  Colon/Rectal Surgery  [address], [address]  [place] [address]  Phone: [phone]  Fax: [phone]  Follow Up Time: 1-3 days   Joby Whitley  Colon/Rectal Surgery  [address], [address]  [place] [address]  Phone: [phone]  Fax: [phone]  Follow Up Time: 1-3 days

## 2023-12-23 NOTE — DISCHARGE NOTE PROVIDER - NSDCMRMEDTOKEN_GEN_ALL_CORE_FT
acetaminophen 325 mg oral tablet: 2 tab(s) orally once As needed Mild Pain (1 - 3)  Cardizem 120 mg oral tablet: 1 tab(s) orally once a day  folic acid 1 mg oral tablet: 1 tab(s) orally once a day  Humira 40 mg/0.8 mL subcutaneous kit: subcutaneously every 2 weeks  ibuprofen 600 mg oral tablet: 1 tab(s) orally every 6 hours as needed for  severe pain  methotrexate 2.5 mg oral tablet: 6 tab(s) orally every 7 days - 0n Mondays  Protonix 40 mg oral delayed release tablet: 1 tab(s) orally once a day, As Needed   acetaminophen 325 mg oral tablet: 2 tab(s) orally every 6 hours As needed Temp greater or equal to 38C (100.4F), Mild Pain (1 - 3)  Cardizem 120 mg oral tablet: 1 tab(s) orally once a day  folic acid 1 mg oral tablet: 1 tab(s) orally once a day  Humira 40 mg/0.8 mL subcutaneous kit: subcutaneously every 2 weeks  hydrocortisone 25 mg rectal suppository: 1 suppository(ies) rectal once a day  methotrexate 2.5 mg oral tablet: 6 tab(s) orally every 7 days - 0n Mondays  polyethylene glycol 3350 oral powder for reconstitution: 17 gram(s) orally once a day  Protonix 40 mg oral delayed release tablet: 1 tab(s) orally once a day as needed for -  senna leaf extract oral tablet: 2 tab(s) orally once a day (at bedtime)

## 2023-12-23 NOTE — PROGRESS NOTE ADULT - SUBJECTIVE AND OBJECTIVE BOX
TEAM [ A ] Surgery Daily Progress Note  =====================================================    SUBJECTIVE: Patient seen and examined at bedside on AM rounds. Patient reports that they're feeling well. Tolerating diet, denies nausea, vomiting. + Flatus/ No BM since admission. OOB/Amublating as tolerated. Denies fever, chills.    PMH:   PAST MEDICAL & SURGICAL HISTORY:  RA (rheumatoid arthritis)      Obesity      GERD (gastroesophageal reflux disease)      Uterine leiomyoma      History of ITP      Atrial fibrillation      HTN (hypertension)      DDD (degenerative disc disease), cervical      OA (osteoarthritis)      S/P tubal ligation  2007      H/O colonoscopy      History of left knee replacement          ALLERGIES:  sulfa drugs (Unknown)      --------------------------------------------------------------------------------------    MEDICATIONS:    Neurologic Medications  acetaminophen     Tablet .. 650 milliGRAM(s) Oral every 6 hours PRN Temp greater or equal to 38C (100.4F), Mild Pain (1 - 3)  melatonin 3 milliGRAM(s) Oral at bedtime PRN Insomnia    Respiratory Medications    Cardiovascular Medications  diltiazem    Tablet 120 milliGRAM(s) Oral daily    Gastrointestinal Medications  aluminum hydroxide/magnesium hydroxide/simethicone Suspension 30 milliLiter(s) Oral every 4 hours PRN Dyspepsia  folic acid 1 milliGRAM(s) Oral daily  pantoprazole    Tablet 40 milliGRAM(s) Oral before breakfast  polyethylene glycol 3350 17 Gram(s) Oral daily  senna 2 Tablet(s) Oral at bedtime    Genitourinary Medications    Hematologic/Oncologic Medications    Antimicrobial/Immunologic Medications    Endocrine/Metabolic Medications    Topical/Other Medications  fluticasone propionate 50 MICROgram(s)/spray Nasal Spray 1 Spray(s) Both Nostrils two times a day    --------------------------------------------------------------------------------------    VITAL SIGNS:    T(C): 36.6 (12-23-23 @ 06:25), Max: 37.7 (12-22-23 @ 22:35)  HR: 69 (12-23-23 @ 06:25) (65 - 80)  BP: 129/81 (12-23-23 @ 06:25) (106/63 - 129/81)  RR: 18 (12-23-23 @ 06:25) (17 - 70)  SpO2: 98% (12-23-23 @ 06:25) (94% - 98%)    --------------------------------------------------------------------------------------  PHYSICAL EXAM  General: No acute distress, resting in bed. Pleasant.  Respiratory: Nonlabored respirations  Cardio: regular rate  Abdomen: Soft NTND  Rectal: Not performed 12/23    --------------------------------------------------------------------------------------    LABS    12-23    141  |  107  |  20  ----------------------------<  70  4.1   |  23  |  0.92    Ca    9.6      23 Dec 2023 05:50  Phos  3.2     12-23  Mg     2.00     12-23    TPro  7.2  /  Alb  3.8  /  TBili  0.5  /  DBili  x   /  AST  19  /  ALT  32  /  AlkPhos  76  12-22        --------------------------------------------------------------------------------------                        13.6   5.21  )-----------( 147      ( 23 Dec 2023 05:50 )             41.3     INS AND OUTS:    Not being documented. No BMs.   --------------------------------------------------------------------------------------

## 2023-12-23 NOTE — DISCHARGE NOTE PROVIDER - HOSPITAL COURSE
Admission HPI: 53F PMH RA (Humira, Methotrexate), AF (Xarelto, last dose 12/21 PM), external hemorrhoid s/p lancing 12/4 and s/p hemorrhoidectomy 12/8 presenting with 1 day of rectal bleeding with blood clots. Pt has been taking ibuprofen 600mg q6h for pain control after hemorrhoidectomy. 3 days ago, pt started to experience epigastric abdominal pain associated with nausea and bloating. Reports that on the morning of admission, she had 3 episodes of rectal bleeding with blood clots; no melena. Associated rectal pain since hemorrhoidectomy but that has been improving. No previous EGD; last colonoscopy 2021 showing hemorrhoids; no other pathology. Pt denies fever, chills, chest pain, sob, diarrhea, constipation.    Hospital course: Patient was evaluated by colorectal surgery and GI.  She underwent EGD 12/22 which showed gastropathy but no signs UGIB.  PPI x 4 weeks recommended.  Hb was stable with no significant blood loss.  CRS recommended sitz baths and proctosol, no intervention.  Patient was stable for discharge on 12/23.

## 2023-12-23 NOTE — DISCHARGE NOTE PROVIDER - NSDCCPCAREPLAN_GEN_ALL_CORE_FT
PRINCIPAL DISCHARGE DIAGNOSIS  Diagnosis: GI bleed  Assessment and Plan of Treatment: Bleeding was from hemorrhoids, upper endoscopy did not show signs of bleeding.  Gastropathy was noted and GI doctors recommend protonix daily for 4 weeks.  Continue sitz baths and proctosol for hemorrhoids.  Follow up with your colorectal surgeon for further rmanagement of the abnromal cells seen on studies done 12/8.

## 2023-12-24 PROBLEM — Z86.2 PERSONAL HISTORY OF DISEASES OF THE BLOOD AND BLOOD-FORMING ORGANS AND CERTAIN DISORDERS INVOLVING THE IMMUNE MECHANISM: Chronic | Status: ACTIVE | Noted: 2023-12-07

## 2023-12-27 LAB
SURGICAL PATHOLOGY STUDY: SIGNIFICANT CHANGE UP
SURGICAL PATHOLOGY STUDY: SIGNIFICANT CHANGE UP

## 2023-12-29 ENCOUNTER — APPOINTMENT (OUTPATIENT)
Dept: CARDIOLOGY | Facility: CLINIC | Age: 53
End: 2023-12-29
Payer: COMMERCIAL

## 2023-12-29 ENCOUNTER — NON-APPOINTMENT (OUTPATIENT)
Age: 53
End: 2023-12-29

## 2023-12-29 VITALS
OXYGEN SATURATION: 95 % | HEIGHT: 68 IN | HEART RATE: 78 BPM | BODY MASS INDEX: 37.89 KG/M2 | DIASTOLIC BLOOD PRESSURE: 84 MMHG | SYSTOLIC BLOOD PRESSURE: 133 MMHG | WEIGHT: 250 LBS

## 2023-12-29 LAB
ALBUMIN SERPL ELPH-MCNC: 4.1 G/DL
ALP BLD-CCNC: 77 U/L
ALT SERPL-CCNC: 37 U/L
ANION GAP SERPL CALC-SCNC: 11 MMOL/L
AST SERPL-CCNC: 22 U/L
BASOPHILS # BLD AUTO: 0.04 K/UL
BASOPHILS NFR BLD AUTO: 0.7 %
BILIRUB SERPL-MCNC: 0.6 MG/DL
BUN SERPL-MCNC: 16 MG/DL
CALCIUM SERPL-MCNC: 9.7 MG/DL
CHLORIDE SERPL-SCNC: 106 MMOL/L
CO2 SERPL-SCNC: 25 MMOL/L
CREAT SERPL-MCNC: 0.81 MG/DL
CRP SERPL-MCNC: <3 MG/L
EGFR: 86 ML/MIN/1.73M2
EOSINOPHIL # BLD AUTO: 0.31 K/UL
EOSINOPHIL NFR BLD AUTO: 5.4 %
ERYTHROCYTE [SEDIMENTATION RATE] IN BLOOD BY WESTERGREN METHOD: 13 MM/HR
GLUCOSE SERPL-MCNC: 75 MG/DL
HBV CORE IGG+IGM SER QL: NONREACTIVE
HBV CORE IGM SER QL: NONREACTIVE
HBV SURFACE AB SER QL: REACTIVE
HBV SURFACE AG SER QL: NONREACTIVE
HCT VFR BLD CALC: 44.4 %
HCV AB SER QL: NONREACTIVE
HCV S/CO RATIO: 0.32 S/CO
HGB BLD-MCNC: 14.3 G/DL
IMM GRANULOCYTES NFR BLD AUTO: 0.2 %
LYMPHOCYTES # BLD AUTO: 2.05 K/UL
LYMPHOCYTES NFR BLD AUTO: 35.5 %
M TB IFN-G BLD-IMP: NEGATIVE
MAN DIFF?: NORMAL
MCHC RBC-ENTMCNC: 30.5 PG
MCHC RBC-ENTMCNC: 32.2 GM/DL
MCV RBC AUTO: 94.7 FL
MONOCYTES # BLD AUTO: 0.58 K/UL
MONOCYTES NFR BLD AUTO: 10.1 %
NEUTROPHILS # BLD AUTO: 2.78 K/UL
NEUTROPHILS NFR BLD AUTO: 48.1 %
PLATELET # BLD AUTO: 154 K/UL
POTASSIUM SERPL-SCNC: 4.1 MMOL/L
PROT SERPL-MCNC: 7 G/DL
QUANTIFERON TB PLUS MITOGEN MINUS NIL: >10 IU/ML
QUANTIFERON TB PLUS NIL: 0.11 IU/ML
QUANTIFERON TB PLUS TB1 MINUS NIL: 0.01 IU/ML
QUANTIFERON TB PLUS TB2 MINUS NIL: 0.01 IU/ML
RBC # BLD: 4.69 M/UL
RBC # FLD: 14.4 %
SODIUM SERPL-SCNC: 141 MMOL/L
WBC # FLD AUTO: 5.77 K/UL

## 2023-12-29 PROCEDURE — 99214 OFFICE O/P EST MOD 30 MIN: CPT | Mod: 25

## 2023-12-29 PROCEDURE — 93000 ELECTROCARDIOGRAM COMPLETE: CPT

## 2023-12-29 NOTE — REASON FOR VISIT
[Symptom and Test Evaluation] : symptom and test evaluation [Arrhythmia/ECG Abnorrmalities] : arrhythmia/ECG abnormalities [FreeTextEntry1] : Pt had hemorrhoidal bleed, requiring surgery. Now back on Xarelto with recurrent clots.  Here for discussion around AC. CHADSVASC 2 (HTN, female sex) Overall mental health has improved.   1. Hold Xarelto for 2 weeks. Restart Mauri 15. If bleeding recurs, consider Watchman. The patient wants to consider d/c AC altogether, but with a CHADSVASC 2 that may not be the best option.  2. Avoid NSAIDs.

## 2023-12-29 NOTE — ASSESSMENT
[FreeTextEntry1] : 1. Hold Xarelto for 2 weeks. Restart Mauri 15. If bleeding recurs, consider Watchman. The patient wants to consider d/c AC altogether, but with a CHADSVASC 2 that may not be the best option.  2. Avoid NSAIDs.   I spent 35 minutes on Ms. Ulrich's care, including face to face time, review of previous records, and documentation.

## 2024-01-08 ENCOUNTER — NON-APPOINTMENT (OUTPATIENT)
Age: 54
End: 2024-01-08

## 2024-01-10 ENCOUNTER — APPOINTMENT (OUTPATIENT)
Dept: COLORECTAL SURGERY | Facility: CLINIC | Age: 54
End: 2024-01-10
Payer: COMMERCIAL

## 2024-01-10 VITALS
HEART RATE: 67 BPM | OXYGEN SATURATION: 98 % | TEMPERATURE: 97.4 F | SYSTOLIC BLOOD PRESSURE: 128 MMHG | DIASTOLIC BLOOD PRESSURE: 83 MMHG

## 2024-01-10 DIAGNOSIS — K64.9 UNSPECIFIED HEMORRHOIDS: ICD-10-CM

## 2024-01-10 DIAGNOSIS — K62.82 DYSPLASIA OF ANUS: ICD-10-CM

## 2024-01-10 PROCEDURE — 99024 POSTOP FOLLOW-UP VISIT: CPT

## 2024-01-10 NOTE — HISTORY OF PRESENT ILLNESS
[FreeTextEntry1] : The 53-year-old female who recently underwent hemorrhoidectomy Recovering well.  Her pathology report Showed evidence of AIN2-3.

## 2024-01-10 NOTE — ASSESSMENT
[FreeTextEntry1] : 53-year-old female status post hemorrhoidectomy recovering well. AIN2-3 in pathology specimen.  Repeat physical exam in 6 months

## 2024-01-24 ENCOUNTER — RX RENEWAL (OUTPATIENT)
Age: 54
End: 2024-01-24

## 2024-01-25 RX ORDER — METHOTREXATE 2.5 MG/1
2.5 TABLET ORAL
Qty: 24 | Refills: 3 | Status: ACTIVE | COMMUNITY
Start: 2021-02-09 | End: 1900-01-01

## 2024-02-07 ENCOUNTER — NON-APPOINTMENT (OUTPATIENT)
Age: 54
End: 2024-02-07

## 2024-02-07 ENCOUNTER — APPOINTMENT (OUTPATIENT)
Dept: CARDIOLOGY | Facility: CLINIC | Age: 54
End: 2024-02-07
Payer: COMMERCIAL

## 2024-02-07 VITALS
DIASTOLIC BLOOD PRESSURE: 90 MMHG | SYSTOLIC BLOOD PRESSURE: 138 MMHG | WEIGHT: 250 LBS | HEIGHT: 68 IN | OXYGEN SATURATION: 93 % | HEART RATE: 68 BPM | BODY MASS INDEX: 37.89 KG/M2

## 2024-02-07 DIAGNOSIS — I48.0 PAROXYSMAL ATRIAL FIBRILLATION: ICD-10-CM

## 2024-02-07 PROCEDURE — 93000 ELECTROCARDIOGRAM COMPLETE: CPT

## 2024-02-07 PROCEDURE — G2211 COMPLEX E/M VISIT ADD ON: CPT

## 2024-02-07 PROCEDURE — 99213 OFFICE O/P EST LOW 20 MIN: CPT

## 2024-02-07 RX ORDER — DILTIAZEM HYDROCHLORIDE 120 MG/1
120 CAPSULE, EXTENDED RELEASE ORAL DAILY
Qty: 90 | Refills: 3 | Status: ACTIVE | COMMUNITY
Start: 1900-01-01 | End: 1900-01-01

## 2024-02-07 NOTE — ASSESSMENT
[FreeTextEntry1] : 1. Atrial Fibrillation. ECG done. Currently rate controlled. On Xarelto for systemic anticoagulation. No evidence of bleeding. 2. BP controlled.  f/u 6m

## 2024-02-07 NOTE — REASON FOR VISIT
[Symptom and Test Evaluation] : symptom and test evaluation [Arrhythmia/ECG Abnorrmalities] : arrhythmia/ECG abnormalities [FreeTextEntry1] : No further bleeding, back on Xarelto Not considering Watchman at this point, feels comfortable with Xarelto.  1. Atrial Fibrillation. ECG done. Currently rate controlled. On Xarelto for systemic anticoagulation. No evidence of bleeding. 2. BP controlled.  f/u 6m

## 2024-02-14 ENCOUNTER — APPOINTMENT (OUTPATIENT)
Dept: CARDIOLOGY | Facility: CLINIC | Age: 54
End: 2024-02-14

## 2024-02-14 NOTE — ED ADULT NURSE NOTE - EXTENSIONS OF SELF_ADULT
[facility]  1 (244) [facility] ([phone])  Text "WELL" to 33800  Website: www.[employer].[place]/.National Suicide Prevention Lifeline 6 [phone] [phone] 409-9035/988 Suicide and Crisis Lifeline None

## 2024-03-12 ENCOUNTER — NON-APPOINTMENT (OUTPATIENT)
Age: 54
End: 2024-03-12

## 2024-03-21 ENCOUNTER — APPOINTMENT (OUTPATIENT)
Dept: PULMONOLOGY | Facility: CLINIC | Age: 54
End: 2024-03-21
Payer: COMMERCIAL

## 2024-03-21 VITALS
OXYGEN SATURATION: 96 % | RESPIRATION RATE: 15 BRPM | BODY MASS INDEX: 38.19 KG/M2 | TEMPERATURE: 97 F | HEIGHT: 68 IN | SYSTOLIC BLOOD PRESSURE: 152 MMHG | WEIGHT: 252 LBS | HEART RATE: 66 BPM | DIASTOLIC BLOOD PRESSURE: 94 MMHG

## 2024-03-21 PROCEDURE — 99213 OFFICE O/P EST LOW 20 MIN: CPT

## 2024-03-21 RX ORDER — BENZONATATE 100 MG/1
100 CAPSULE ORAL 3 TIMES DAILY
Qty: 90 | Refills: 1 | Status: ACTIVE | COMMUNITY
Start: 2024-03-21 | End: 1900-01-01

## 2024-03-21 RX ORDER — LORATADINE 5 MG/5 ML
0.05 SOLUTION, ORAL ORAL TWICE DAILY
Qty: 1 | Refills: 0 | Status: ACTIVE | COMMUNITY
Start: 2024-03-21 | End: 1900-01-01

## 2024-03-21 NOTE — REVIEW OF SYSTEMS
[Nasal Congestion] : nasal congestion [Cough] : cough [Seasonal Allergies] : seasonal allergies [Obesity] : obesity [Negative] : Psychiatric [Dry Eyes] : no dry eyes [Ear Disturbance] : no ear disturbance [Eye Irritation] : no eye irritation [Epistaxis] : no epistaxis [Sore Throat] : no sore throat [Dry Mouth] : no dry mouth [Postnasal Drip] : no postnasal drip [Sinus Problems] : no sinus problems [Mouth Ulcers] : no mouth ulcers [Poor Dentition] : no poor dentition [Edentulous] : no edentulous [Hemoptysis] : no hemoptysis [Chest Tightness] : no chest tightness [Frequent URIs] : no frequent URIs [Sputum] : no sputum [Dyspnea] : no dyspnea [Pleuritic Pain] : no pleuritic pain [Wheezing] : no wheezing [A.M. Dry Mouth] : no a.m. dry mouth [Watery Eyes] : no watery eyes [SOB on Exertion] : no sob on exertion [Hay Fever] : no hay fever [Itchy Eyes] : no itchy eyes [Nasal Discharge] : no nasal discharge [Hives] : no hives [Angioedema] : no angioedema [Immunocompromised] : not immunocompromised

## 2024-03-21 NOTE — HISTORY OF PRESENT ILLNESS
[Never] : never [TextBox_4] : Patient is a 52y/o F with a PMH of RA on Numera, Methotrexate, A-Fib on Xarelto and Cardizem, who presents for F/U with c/o cough and nasal congestion.  Upon today's presentation she endorses cough with nasal congestion X2weeks, seen by UC 3 days ago, covid and flu negative, diagnosed with bronchitis, prescribed Medrol dose pack and albuterol inhaler, and Promethazine which she feels were not helpful to her symptoms.  Reports Promethazine helped cough, made her drowsy, took it only once. She has a scheduled trip to [place] tomorrow. Currently denies SOB, wheezing, fever, chills, chest tightness, with noted change in voice.

## 2024-03-21 NOTE — ASSESSMENT
[FreeTextEntry1] : -Continue Medrol dose pack as prescribed until completed -Albuterol PRN  -Prescribed Promethazine made her drowsy-discontinue  -Benzonatate prescribed -Prescribed Afrin prior to flight; instructed no longer than 3 days of use -Fluticasone nasal spray prescribed  attending: agree with above

## 2024-03-29 ENCOUNTER — RX RENEWAL (OUTPATIENT)
Age: 54
End: 2024-03-29

## 2024-04-09 ENCOUNTER — NON-APPOINTMENT (OUTPATIENT)
Age: 54
End: 2024-04-09

## 2024-04-16 RX ORDER — ADALIMUMAB 40MG/0.4ML
40 KIT SUBCUTANEOUS
Qty: 6 | Refills: 1 | Status: ACTIVE | COMMUNITY
Start: 2019-12-16

## 2024-05-14 ENCOUNTER — APPOINTMENT (OUTPATIENT)
Dept: ORTHOPEDIC SURGERY | Facility: CLINIC | Age: 54
End: 2024-05-14
Payer: COMMERCIAL

## 2024-05-14 VITALS — HEIGHT: 68 IN | BODY MASS INDEX: 38.19 KG/M2 | WEIGHT: 252 LBS

## 2024-05-14 DIAGNOSIS — M17.12 UNILATERAL PRIMARY OSTEOARTHRITIS, LEFT KNEE: ICD-10-CM

## 2024-05-14 PROCEDURE — 73562 X-RAY EXAM OF KNEE 3: CPT | Mod: LT

## 2024-05-14 PROCEDURE — 20610 DRAIN/INJ JOINT/BURSA W/O US: CPT | Mod: LT

## 2024-05-14 PROCEDURE — 99215 OFFICE O/P EST HI 40 MIN: CPT | Mod: 25

## 2024-05-17 DIAGNOSIS — Z79.899 OTHER LONG TERM (CURRENT) DRUG THERAPY: ICD-10-CM

## 2024-05-20 LAB
ALBUMIN SERPL ELPH-MCNC: 4.1 G/DL
ALP BLD-CCNC: 69 U/L
ALT SERPL-CCNC: 21 U/L
ANION GAP SERPL CALC-SCNC: 11 MMOL/L
AST SERPL-CCNC: 17 U/L
BASOPHILS # BLD AUTO: 0.03 K/UL
BASOPHILS NFR BLD AUTO: 0.6 %
BILIRUB SERPL-MCNC: 0.5 MG/DL
BUN SERPL-MCNC: 22 MG/DL
CALCIUM SERPL-MCNC: 9.6 MG/DL
CHLORIDE SERPL-SCNC: 109 MMOL/L
CO2 SERPL-SCNC: 24 MMOL/L
CREAT SERPL-MCNC: 0.87 MG/DL
CRP SERPL-MCNC: <3 MG/L
EGFR: 78 ML/MIN/1.73M2
EOSINOPHIL # BLD AUTO: 0.11 K/UL
EOSINOPHIL NFR BLD AUTO: 2.1 %
ERYTHROCYTE [SEDIMENTATION RATE] IN BLOOD BY WESTERGREN METHOD: 18 MM/HR
GLUCOSE SERPL-MCNC: 77 MG/DL
HCT VFR BLD CALC: 44.6 %
HGB BLD-MCNC: 14.6 G/DL
IMM GRANULOCYTES NFR BLD AUTO: 0.2 %
LYMPHOCYTES # BLD AUTO: 2.61 K/UL
LYMPHOCYTES NFR BLD AUTO: 50 %
MAN DIFF?: NORMAL
MCHC RBC-ENTMCNC: 29.7 PG
MCHC RBC-ENTMCNC: 32.7 GM/DL
MCV RBC AUTO: 90.7 FL
MONOCYTES # BLD AUTO: 0.49 K/UL
MONOCYTES NFR BLD AUTO: 9.4 %
NEUTROPHILS # BLD AUTO: 1.97 K/UL
NEUTROPHILS NFR BLD AUTO: 37.7 %
PLATELET # BLD AUTO: 114 K/UL
POTASSIUM SERPL-SCNC: 4.4 MMOL/L
PROT SERPL-MCNC: 6.9 G/DL
RBC # BLD: 4.92 M/UL
RBC # FLD: 14.5 %
SODIUM SERPL-SCNC: 144 MMOL/L
WBC # FLD AUTO: 5.22 K/UL

## 2024-05-28 ENCOUNTER — NON-APPOINTMENT (OUTPATIENT)
Age: 54
End: 2024-05-28

## 2024-06-05 ENCOUNTER — OUTPATIENT (OUTPATIENT)
Dept: OUTPATIENT SERVICES | Facility: HOSPITAL | Age: 54
LOS: 1 days | End: 2024-06-05
Payer: COMMERCIAL

## 2024-06-05 ENCOUNTER — TRANSCRIPTION ENCOUNTER (OUTPATIENT)
Age: 54
End: 2024-06-05

## 2024-06-05 ENCOUNTER — APPOINTMENT (OUTPATIENT)
Dept: PULMONOLOGY | Facility: CLINIC | Age: 54
End: 2024-06-05
Payer: COMMERCIAL

## 2024-06-05 ENCOUNTER — APPOINTMENT (OUTPATIENT)
Dept: RADIOLOGY | Facility: CLINIC | Age: 54
End: 2024-06-05
Payer: COMMERCIAL

## 2024-06-05 DIAGNOSIS — U07.1 COVID-19: ICD-10-CM

## 2024-06-05 DIAGNOSIS — R05.9 COUGH, UNSPECIFIED: ICD-10-CM

## 2024-06-05 DIAGNOSIS — R09.81 NASAL CONGESTION: ICD-10-CM

## 2024-06-05 DIAGNOSIS — Z96.652 PRESENCE OF LEFT ARTIFICIAL KNEE JOINT: Chronic | ICD-10-CM

## 2024-06-05 DIAGNOSIS — Z98.51 TUBAL LIGATION STATUS: Chronic | ICD-10-CM

## 2024-06-05 DIAGNOSIS — Z98.890 OTHER SPECIFIED POSTPROCEDURAL STATES: Chronic | ICD-10-CM

## 2024-06-05 DIAGNOSIS — J02.9 ACUTE PHARYNGITIS, UNSPECIFIED: ICD-10-CM

## 2024-06-05 PROCEDURE — 71046 X-RAY EXAM CHEST 2 VIEWS: CPT | Mod: 26

## 2024-06-05 PROCEDURE — 99214 OFFICE O/P EST MOD 30 MIN: CPT

## 2024-06-05 PROCEDURE — 71046 X-RAY EXAM CHEST 2 VIEWS: CPT

## 2024-06-05 RX ORDER — AZELASTINE HYDROCHLORIDE 137 UG/1
137 SPRAY, METERED NASAL TWICE DAILY
Qty: 3 | Refills: 1 | Status: ACTIVE | COMMUNITY
Start: 2021-04-13 | End: 1900-01-01

## 2024-06-05 RX ORDER — AZITHROMYCIN 250 MG/1
250 TABLET, FILM COATED ORAL
Qty: 1 | Refills: 0 | Status: ACTIVE | COMMUNITY
Start: 2024-06-05 | End: 1900-01-01

## 2024-06-05 RX ORDER — PREDNISONE 20 MG/1
20 TABLET ORAL
Qty: 10 | Refills: 0 | Status: ACTIVE | COMMUNITY
Start: 2024-06-05 | End: 1900-01-01

## 2024-06-05 NOTE — REASON FOR VISIT
[Acute] : an acute visit [Home] : at home, [unfilled] , at the time of the visit. [Medical Office: ([facility])___] : at the medical office located in  [Patient] : the patient [This encounter was initiated by telehealth (audio with video) and converted to telephone (audio only) due to technical difficulties.] : This encounter was initiated by telehealth (audio with video) and converted to telephone (audio only) due to technical difficulties. [Cough] : cough [Shortness of Breath] : shortness of breath [Wheezing] : wheezing

## 2024-06-05 NOTE — ASSESSMENT
[FreeTextEntry1] : -Chest X-ray ordered r/o PNA -Prescribed Z-pac and Prednisone -Renewed Fluticasone Propionate and Azelastine nasal spray -Advised to call office immediately with any change or worsening of symptoms.

## 2024-06-05 NOTE — REVIEW OF SYSTEMS
[Fatigue] : fatigue [Ear Disturbance] : ear disturbance [Sore Throat] : sore throat [Nasal Congestion] : nasal congestion [Sinus Problems] : sinus problems [Cough] : cough [Sputum] : sputum [Wheezing] : wheezing [Negative] : Endocrine

## 2024-06-05 NOTE — HISTORY OF PRESENT ILLNESS
[Never] : never [TextBox_4] : Patient is a 53y/o F with a PMH of RA on Numera, Methotrexate, A-Fib on Xarelto and Cardizem, who presents via televist with soime technical difficulties for acute visit.  Upon visit, patient states she tested positive for Covid 14 days ago, with cough, sob, nasal congestion, wheezing, and poor sleep which has not improve. Patient states she no longer has fever, however remains with cough, thick green sputum, nasal/chest congestion. wheezing, sore throat, ear pain, and poor sleep. Has been using OTC Theraflu, Tylenol, Mucinex, fluticasone with no relief of symptoms, unable to work for past 2 weeks.

## 2024-06-06 ENCOUNTER — NON-APPOINTMENT (OUTPATIENT)
Age: 54
End: 2024-06-06

## 2024-06-12 ENCOUNTER — RX CHANGE (OUTPATIENT)
Age: 54
End: 2024-06-12

## 2024-06-12 RX ORDER — FLUTICASONE PROPIONATE 50 UG/1
50 SPRAY, METERED NASAL
Qty: 96 | Refills: 1 | Status: ACTIVE | COMMUNITY
Start: 1900-01-01 | End: 1900-01-01

## 2024-06-12 RX ORDER — FLUTICASONE PROPIONATE 50 UG/1
50 SPRAY, METERED NASAL
Qty: 1 | Refills: 2 | Status: DISCONTINUED | COMMUNITY
Start: 2024-03-21 | End: 2024-06-12

## 2024-07-10 ENCOUNTER — APPOINTMENT (OUTPATIENT)
Dept: COLORECTAL SURGERY | Facility: CLINIC | Age: 54
End: 2024-07-10

## 2024-07-19 ENCOUNTER — APPOINTMENT (OUTPATIENT)
Dept: RHEUMATOLOGY | Facility: CLINIC | Age: 54
End: 2024-07-19
Payer: COMMERCIAL

## 2024-07-19 VITALS
SYSTOLIC BLOOD PRESSURE: 109 MMHG | BODY MASS INDEX: 37.59 KG/M2 | WEIGHT: 248 LBS | HEIGHT: 68 IN | DIASTOLIC BLOOD PRESSURE: 76 MMHG | HEART RATE: 73 BPM | OXYGEN SATURATION: 96 %

## 2024-07-19 DIAGNOSIS — M17.9 OSTEOARTHRITIS OF KNEE, UNSPECIFIED: ICD-10-CM

## 2024-07-19 DIAGNOSIS — Z79.899 OTHER LONG TERM (CURRENT) DRUG THERAPY: ICD-10-CM

## 2024-07-19 DIAGNOSIS — M06.9 RHEUMATOID ARTHRITIS, UNSPECIFIED: ICD-10-CM

## 2024-07-19 PROCEDURE — 99214 OFFICE O/P EST MOD 30 MIN: CPT

## 2024-07-19 PROCEDURE — G2211 COMPLEX E/M VISIT ADD ON: CPT | Mod: NC

## 2024-08-21 ENCOUNTER — RX RENEWAL (OUTPATIENT)
Age: 54
End: 2024-08-21

## 2024-08-22 ENCOUNTER — APPOINTMENT (OUTPATIENT)
Dept: ORTHOPEDIC SURGERY | Facility: CLINIC | Age: 54
End: 2024-08-22

## 2024-10-25 ENCOUNTER — RX RENEWAL (OUTPATIENT)
Age: 54
End: 2024-10-25

## 2024-10-28 ENCOUNTER — RX RENEWAL (OUTPATIENT)
Age: 54
End: 2024-10-28

## 2024-11-18 ENCOUNTER — NON-APPOINTMENT (OUTPATIENT)
Age: 54
End: 2024-11-18

## 2024-11-18 ENCOUNTER — APPOINTMENT (OUTPATIENT)
Dept: CARDIOLOGY | Facility: CLINIC | Age: 54
End: 2024-11-18
Payer: COMMERCIAL

## 2024-11-18 VITALS
HEIGHT: 68 IN | WEIGHT: 244 LBS | HEART RATE: 71 BPM | SYSTOLIC BLOOD PRESSURE: 126 MMHG | BODY MASS INDEX: 36.98 KG/M2 | DIASTOLIC BLOOD PRESSURE: 86 MMHG | TEMPERATURE: 97 F

## 2024-11-18 DIAGNOSIS — R07.9 CHEST PAIN, UNSPECIFIED: ICD-10-CM

## 2024-11-18 PROCEDURE — G2211 COMPLEX E/M VISIT ADD ON: CPT | Mod: NC

## 2024-11-18 PROCEDURE — 93000 ELECTROCARDIOGRAM COMPLETE: CPT

## 2024-11-18 PROCEDURE — 99214 OFFICE O/P EST MOD 30 MIN: CPT

## 2024-11-23 ENCOUNTER — TRANSCRIPTION ENCOUNTER (OUTPATIENT)
Age: 54
End: 2024-11-23

## 2024-11-25 ENCOUNTER — RX RENEWAL (OUTPATIENT)
Age: 54
End: 2024-11-25

## 2024-11-25 ENCOUNTER — TRANSCRIPTION ENCOUNTER (OUTPATIENT)
Age: 54
End: 2024-11-25

## 2024-12-19 ENCOUNTER — APPOINTMENT (OUTPATIENT)
Dept: CV DIAGNOSTICS | Facility: HOSPITAL | Age: 54
End: 2024-12-19

## 2024-12-19 ENCOUNTER — OUTPATIENT (OUTPATIENT)
Dept: OUTPATIENT SERVICES | Facility: HOSPITAL | Age: 54
LOS: 1 days | End: 2024-12-19
Payer: COMMERCIAL

## 2024-12-19 ENCOUNTER — APPOINTMENT (OUTPATIENT)
Dept: CV DIAGNOSITCS | Facility: HOSPITAL | Age: 54
End: 2024-12-19

## 2024-12-19 ENCOUNTER — RESULT REVIEW (OUTPATIENT)
Age: 54
End: 2024-12-19

## 2024-12-19 DIAGNOSIS — Z96.652 PRESENCE OF LEFT ARTIFICIAL KNEE JOINT: Chronic | ICD-10-CM

## 2024-12-19 DIAGNOSIS — Z98.51 TUBAL LIGATION STATUS: Chronic | ICD-10-CM

## 2024-12-19 DIAGNOSIS — R07.9 CHEST PAIN, UNSPECIFIED: ICD-10-CM

## 2024-12-19 DIAGNOSIS — Z98.890 OTHER SPECIFIED POSTPROCEDURAL STATES: Chronic | ICD-10-CM

## 2024-12-19 PROCEDURE — 93306 TTE W/DOPPLER COMPLETE: CPT | Mod: 26

## 2024-12-24 ENCOUNTER — RX RENEWAL (OUTPATIENT)
Age: 54
End: 2024-12-24

## 2025-02-09 ENCOUNTER — NON-APPOINTMENT (OUTPATIENT)
Age: 55
End: 2025-02-09

## 2025-02-14 ENCOUNTER — APPOINTMENT (OUTPATIENT)
Dept: RHEUMATOLOGY | Facility: CLINIC | Age: 55
End: 2025-02-14

## 2025-02-14 VITALS
HEIGHT: 68 IN | SYSTOLIC BLOOD PRESSURE: 133 MMHG | RESPIRATION RATE: 18 BRPM | HEART RATE: 72 BPM | OXYGEN SATURATION: 99 % | DIASTOLIC BLOOD PRESSURE: 88 MMHG | WEIGHT: 246 LBS | BODY MASS INDEX: 37.28 KG/M2

## 2025-02-14 DIAGNOSIS — Z79.899 OTHER LONG TERM (CURRENT) DRUG THERAPY: ICD-10-CM

## 2025-02-14 DIAGNOSIS — M06.9 RHEUMATOID ARTHRITIS, UNSPECIFIED: ICD-10-CM

## 2025-02-14 PROCEDURE — 99214 OFFICE O/P EST MOD 30 MIN: CPT

## 2025-02-18 LAB
ALBUMIN SERPL ELPH-MCNC: 4.1 G/DL
ALP BLD-CCNC: 80 U/L
ALT SERPL-CCNC: 23 U/L
ANION GAP SERPL CALC-SCNC: 10 MMOL/L
AST SERPL-CCNC: 19 U/L
BASOPHILS # BLD AUTO: 0.02 K/UL
BASOPHILS NFR BLD AUTO: 0.5 %
BILIRUB SERPL-MCNC: 0.5 MG/DL
BUN SERPL-MCNC: 16 MG/DL
CALCIUM SERPL-MCNC: 9.7 MG/DL
CHLORIDE SERPL-SCNC: 109 MMOL/L
CO2 SERPL-SCNC: 24 MMOL/L
CREAT SERPL-MCNC: 0.76 MG/DL
CRP SERPL-MCNC: <3 MG/L
EGFR: 92 ML/MIN/1.73M2
EOSINOPHIL # BLD AUTO: 0.15 K/UL
EOSINOPHIL NFR BLD AUTO: 3.5 %
ERYTHROCYTE [SEDIMENTATION RATE] IN BLOOD BY WESTERGREN METHOD: 51 MM/HR
GLUCOSE SERPL-MCNC: 76 MG/DL
HBV CORE IGG+IGM SER QL: NONREACTIVE
HBV CORE IGM SER QL: NONREACTIVE
HBV SURFACE AB SER QL: ABNORMAL
HBV SURFACE AG SER QL: NONREACTIVE
HCT VFR BLD CALC: 46.8 %
HCV AB SER QL: NONREACTIVE
HCV S/CO RATIO: 0.18 S/CO
HGB BLD-MCNC: 14.5 G/DL
IMM GRANULOCYTES NFR BLD AUTO: 0.2 %
LYMPHOCYTES # BLD AUTO: 1.84 K/UL
LYMPHOCYTES NFR BLD AUTO: 43.1 %
MAN DIFF?: NORMAL
MCHC RBC-ENTMCNC: 29.2 PG
MCHC RBC-ENTMCNC: 31 G/DL
MCV RBC AUTO: 94.4 FL
MONOCYTES # BLD AUTO: 0.46 K/UL
MONOCYTES NFR BLD AUTO: 10.8 %
NEUTROPHILS # BLD AUTO: 1.79 K/UL
NEUTROPHILS NFR BLD AUTO: 41.9 %
PLATELET # BLD AUTO: 141 K/UL
POTASSIUM SERPL-SCNC: 4.5 MMOL/L
PROT SERPL-MCNC: 7.1 G/DL
RBC # BLD: 4.96 M/UL
RBC # FLD: 14.3 %
SODIUM SERPL-SCNC: 143 MMOL/L
WBC # FLD AUTO: 4.27 K/UL

## 2025-02-19 LAB
M TB IFN-G BLD-IMP: NEGATIVE
QUANTIFERON TB PLUS MITOGEN MINUS NIL: >10 IU/ML
QUANTIFERON TB PLUS NIL: 0.06 IU/ML
QUANTIFERON TB PLUS TB1 MINUS NIL: 0.02 IU/ML
QUANTIFERON TB PLUS TB2 MINUS NIL: 0.01 IU/ML

## 2025-04-11 ENCOUNTER — RX RENEWAL (OUTPATIENT)
Age: 55
End: 2025-04-11

## 2025-04-18 RX ORDER — ADALIMUMAB-RYVK 40MG/0.4ML
40 KIT SUBCUTANEOUS
Qty: 6 | Refills: 3 | Status: ACTIVE | COMMUNITY
Start: 2025-04-14

## 2025-05-13 DIAGNOSIS — M06.9 RHEUMATOID ARTHRITIS, UNSPECIFIED: ICD-10-CM

## 2025-05-15 ENCOUNTER — RX RENEWAL (OUTPATIENT)
Age: 55
End: 2025-05-15

## 2025-05-19 RX ORDER — ADALIMUMAB 40MG/0.4ML
40 KIT SUBCUTANEOUS
Qty: 12 | Refills: 1 | Status: ACTIVE | COMMUNITY
Start: 2025-05-13

## 2025-05-21 ENCOUNTER — APPOINTMENT (OUTPATIENT)
Dept: CARDIOLOGY | Facility: CLINIC | Age: 55
End: 2025-05-21
Payer: COMMERCIAL

## 2025-05-21 ENCOUNTER — NON-APPOINTMENT (OUTPATIENT)
Age: 55
End: 2025-05-21

## 2025-05-21 VITALS — DIASTOLIC BLOOD PRESSURE: 83 MMHG | SYSTOLIC BLOOD PRESSURE: 117 MMHG

## 2025-05-21 VITALS
OXYGEN SATURATION: 98 % | DIASTOLIC BLOOD PRESSURE: 84 MMHG | HEART RATE: 68 BPM | WEIGHT: 249 LBS | HEIGHT: 68 IN | SYSTOLIC BLOOD PRESSURE: 132 MMHG | BODY MASS INDEX: 37.74 KG/M2

## 2025-05-21 DIAGNOSIS — I48.0 PAROXYSMAL ATRIAL FIBRILLATION: ICD-10-CM

## 2025-05-21 PROCEDURE — 93000 ELECTROCARDIOGRAM COMPLETE: CPT

## 2025-05-21 PROCEDURE — 99214 OFFICE O/P EST MOD 30 MIN: CPT

## 2025-05-21 PROCEDURE — G2211 COMPLEX E/M VISIT ADD ON: CPT | Mod: NC

## 2025-05-23 ENCOUNTER — APPOINTMENT (OUTPATIENT)
Dept: RADIOLOGY | Facility: IMAGING CENTER | Age: 55
End: 2025-05-23

## 2025-05-23 ENCOUNTER — OUTPATIENT (OUTPATIENT)
Dept: OUTPATIENT SERVICES | Facility: HOSPITAL | Age: 55
LOS: 1 days | End: 2025-05-23
Payer: COMMERCIAL

## 2025-05-23 DIAGNOSIS — M06.9 RHEUMATOID ARTHRITIS, UNSPECIFIED: ICD-10-CM

## 2025-05-23 DIAGNOSIS — Z98.890 OTHER SPECIFIED POSTPROCEDURAL STATES: Chronic | ICD-10-CM

## 2025-05-23 DIAGNOSIS — Z98.51 TUBAL LIGATION STATUS: Chronic | ICD-10-CM

## 2025-05-23 DIAGNOSIS — Z96.652 PRESENCE OF LEFT ARTIFICIAL KNEE JOINT: Chronic | ICD-10-CM

## 2025-05-23 PROCEDURE — 73620 X-RAY EXAM OF FOOT: CPT | Mod: 26,50

## 2025-05-23 PROCEDURE — 72052 X-RAY EXAM NECK SPINE 6/>VWS: CPT | Mod: 26

## 2025-05-23 PROCEDURE — 73130 X-RAY EXAM OF HAND: CPT | Mod: 26,50

## 2025-05-23 PROCEDURE — 73130 X-RAY EXAM OF HAND: CPT

## 2025-05-23 PROCEDURE — 72052 X-RAY EXAM NECK SPINE 6/>VWS: CPT

## 2025-05-23 PROCEDURE — 73620 X-RAY EXAM OF FOOT: CPT

## 2025-05-29 RX ORDER — PREDNISONE 5 MG/1
5 TABLET ORAL
Qty: 7 | Refills: 0 | Status: ACTIVE | COMMUNITY
Start: 2025-05-29 | End: 1900-01-01

## 2025-06-03 ENCOUNTER — TRANSCRIPTION ENCOUNTER (OUTPATIENT)
Age: 55
End: 2025-06-03

## 2025-06-19 ENCOUNTER — RX RENEWAL (OUTPATIENT)
Age: 55
End: 2025-06-19

## 2025-06-23 ENCOUNTER — NON-APPOINTMENT (OUTPATIENT)
Age: 55
End: 2025-06-23

## 2025-08-01 ENCOUNTER — APPOINTMENT (OUTPATIENT)
Dept: RHEUMATOLOGY | Facility: CLINIC | Age: 55
End: 2025-08-01
Payer: COMMERCIAL

## 2025-08-01 VITALS
SYSTOLIC BLOOD PRESSURE: 116 MMHG | HEIGHT: 68 IN | DIASTOLIC BLOOD PRESSURE: 80 MMHG | BODY MASS INDEX: 35.46 KG/M2 | WEIGHT: 234 LBS | OXYGEN SATURATION: 98 % | HEART RATE: 73 BPM

## 2025-08-01 DIAGNOSIS — Z79.899 OTHER LONG TERM (CURRENT) DRUG THERAPY: ICD-10-CM

## 2025-08-01 DIAGNOSIS — M06.9 RHEUMATOID ARTHRITIS, UNSPECIFIED: ICD-10-CM

## 2025-08-01 PROCEDURE — G2211 COMPLEX E/M VISIT ADD ON: CPT | Mod: NC

## 2025-08-01 PROCEDURE — 99214 OFFICE O/P EST MOD 30 MIN: CPT

## 2025-08-06 DIAGNOSIS — D69.6 THROMBOCYTOPENIA, UNSPECIFIED: ICD-10-CM

## 2025-08-06 LAB
ALBUMIN SERPL ELPH-MCNC: 4 G/DL
ALP BLD-CCNC: 78 U/L
ALT SERPL-CCNC: 20 U/L
ANION GAP SERPL CALC-SCNC: 12 MMOL/L
AST SERPL-CCNC: 18 U/L
BASOPHILS # BLD AUTO: 0.04 K/UL
BASOPHILS NFR BLD AUTO: 0.7 %
BILIRUB SERPL-MCNC: 0.6 MG/DL
BUN SERPL-MCNC: 17 MG/DL
CALCIUM SERPL-MCNC: 9.9 MG/DL
CHLORIDE SERPL-SCNC: 107 MMOL/L
CO2 SERPL-SCNC: 24 MMOL/L
CREAT SERPL-MCNC: 0.75 MG/DL
CRP SERPL-MCNC: <3 MG/L
EGFRCR SERPLBLD CKD-EPI 2021: 93 ML/MIN/1.73M2
EOSINOPHIL # BLD AUTO: 0.09 K/UL
EOSINOPHIL NFR BLD AUTO: 1.6 %
ERYTHROCYTE [SEDIMENTATION RATE] IN BLOOD BY WESTERGREN METHOD: 23 MM/HR
GLUCOSE SERPL-MCNC: 71 MG/DL
HCT VFR BLD CALC: 45.1 %
HGB BLD-MCNC: 14.4 G/DL
IMM GRANULOCYTES NFR BLD AUTO: 0.2 %
LYMPHOCYTES # BLD AUTO: 2.23 K/UL
LYMPHOCYTES NFR BLD AUTO: 39.2 %
MAN DIFF?: NORMAL
MCHC RBC-ENTMCNC: 29.9 PG
MCHC RBC-ENTMCNC: 31.9 G/DL
MCV RBC AUTO: 93.6 FL
MONOCYTES # BLD AUTO: 0.5 K/UL
MONOCYTES NFR BLD AUTO: 8.8 %
NEUTROPHILS # BLD AUTO: 2.82 K/UL
NEUTROPHILS NFR BLD AUTO: 49.5 %
PLATELET # BLD AUTO: 122 K/UL
POTASSIUM SERPL-SCNC: 4.1 MMOL/L
PROT SERPL-MCNC: 7 G/DL
RBC # BLD: 4.82 M/UL
RBC # FLD: 14 %
SODIUM SERPL-SCNC: 143 MMOL/L
WBC # FLD AUTO: 5.69 K/UL

## 2025-08-18 ENCOUNTER — TRANSCRIPTION ENCOUNTER (OUTPATIENT)
Age: 55
End: 2025-08-18

## 2025-08-19 ENCOUNTER — RX RENEWAL (OUTPATIENT)
Age: 55
End: 2025-08-19

## 2025-08-21 DIAGNOSIS — M06.9 RHEUMATOID ARTHRITIS, UNSPECIFIED: ICD-10-CM

## 2025-08-24 RX ORDER — ADALIMUMAB-ADAZ 40 MG/.4ML
40 INJECTION, SOLUTION SUBCUTANEOUS
Qty: 3 | Refills: 3 | Status: ACTIVE | COMMUNITY
Start: 2025-08-21

## 2025-08-27 ENCOUNTER — TRANSCRIPTION ENCOUNTER (OUTPATIENT)
Age: 55
End: 2025-08-27

## 2025-08-27 RX ORDER — ADALIMUMAB-RYVK 40MG/0.4ML
40 KIT SUBCUTANEOUS
Qty: 6 | Refills: 2 | Status: ACTIVE | COMMUNITY
Start: 2025-08-27

## 2025-09-04 ENCOUNTER — TRANSCRIPTION ENCOUNTER (OUTPATIENT)
Age: 55
End: 2025-09-04

## 2025-09-05 ENCOUNTER — TRANSCRIPTION ENCOUNTER (OUTPATIENT)
Age: 55
End: 2025-09-05

## (undated) DEVICE — CONTAINER FORMALIN 80ML YELLOW

## (undated) DEVICE — DRSG COMBINE 5X9"

## (undated) DEVICE — TUBING IV SET GRAVITY 3Y 100" MACRO

## (undated) DEVICE — LUBRICATING JELLY ONESHOT 1.25OZ

## (undated) DEVICE — CONTAINER FORMALIN 10% 20ML

## (undated) DEVICE — SUT POLYSORB 3-0 27" GS-22 UNDYED

## (undated) DEVICE — DRSG 2X2

## (undated) DEVICE — TUBING MEDI-VAC W MAXIGRIP CONNECTORS 1/4"X6'

## (undated) DEVICE — CLAMP BX HOT RAD JAW 3

## (undated) DEVICE — DRAPE TOWEL BLUE 17" X 24"

## (undated) DEVICE — MEDICATION LABELS W MARKER

## (undated) DEVICE — POSITIONER FOAM EGG CRATE ULNAR 2PCS (PINK)

## (undated) DEVICE — SOL IRR POUR H2O 250ML

## (undated) DEVICE — GLV 7.5 PROTEXIS (CREAM) NEU-THERA

## (undated) DEVICE — BIOPSY FORCEP RADIAL JAW 4 STANDARD WITH NEEDLE

## (undated) DEVICE — PREP BETADINE SPONGE STICKS

## (undated) DEVICE — GLV 7.5 PROTEXIS (BLUE)

## (undated) DEVICE — BIOPSY FORCEP COLD DISP

## (undated) DEVICE — ELCTR ECG CONDUCTIVE ADHESIVE

## (undated) DEVICE — DRAPE THYROID 77" X 123"

## (undated) DEVICE — CATH IV SAFE BC 22G X 1" (BLUE)

## (undated) DEVICE — SPECIMEN CONTAINER 100ML

## (undated) DEVICE — DRSG BANDAID 0.75X3"

## (undated) DEVICE — DENTURE CUP PINK

## (undated) DEVICE — GOWN LG

## (undated) DEVICE — SOL IRR POUR NS 0.9% 500ML

## (undated) DEVICE — BITE BLOCK ADULT 20 X 27MM (GREEN)

## (undated) DEVICE — PACK IV START WITH CHG

## (undated) DEVICE — LINE BREATHE SAMPLNG

## (undated) DEVICE — WARMING BLANKET UPPER ADULT

## (undated) DEVICE — LIGASURE SMALL JAW

## (undated) DEVICE — DRSG CURITY GAUZE SPONGE 4 X 4" 12-PLY NON-STERILE

## (undated) DEVICE — SUT POLYSORB 3-0 30" V-20 UNDYED

## (undated) DEVICE — LUBRICATING JELLY HR ONE SHOT 3G

## (undated) DEVICE — DRAPE INSTRUMENT POUCH 6.75" X 11"

## (undated) DEVICE — SALIVA EJECTOR (BLUE)

## (undated) DEVICE — BASIN EMESIS 10IN GRADUATED MAUVE

## (undated) DEVICE — PACK MINOR

## (undated) DEVICE — UNDERPAD LINEN SAVER 17 X 24"